# Patient Record
Sex: MALE | Race: WHITE | Employment: UNEMPLOYED | URBAN - METROPOLITAN AREA
[De-identification: names, ages, dates, MRNs, and addresses within clinical notes are randomized per-mention and may not be internally consistent; named-entity substitution may affect disease eponyms.]

---

## 2019-01-04 ENCOUNTER — PROCEDURE VISIT (OUTPATIENT)
Dept: PHYSICAL MEDICINE AND REHAB | Age: 59
End: 2019-01-04

## 2019-01-04 VITALS
WEIGHT: 185 LBS | HEIGHT: 68 IN | HEART RATE: 88 BPM | SYSTOLIC BLOOD PRESSURE: 143 MMHG | DIASTOLIC BLOOD PRESSURE: 87 MMHG | BODY MASS INDEX: 28.04 KG/M2

## 2019-01-04 DIAGNOSIS — Z02.71 DISABILITY EXAMINATION: Primary | ICD-10-CM

## 2019-01-04 PROCEDURE — MISCBDD BUREAU OF DISABILITY DETERMINATION: Performed by: PHYSICAL MEDICINE & REHABILITATION

## 2019-01-04 RX ORDER — AMIODARONE HYDROCHLORIDE 200 MG/1
200 TABLET ORAL DAILY
COMMUNITY
End: 2020-12-10 | Stop reason: ALTCHOICE

## 2019-01-04 RX ORDER — CARVEDILOL 12.5 MG/1
12.5 TABLET ORAL 2 TIMES DAILY
COMMUNITY

## 2019-01-04 RX ORDER — HYDROCHLOROTHIAZIDE 12.5 MG/1
12.5 TABLET ORAL DAILY
COMMUNITY
End: 2020-12-10 | Stop reason: ALTCHOICE

## 2019-01-04 RX ORDER — LISINOPRIL 10 MG/1
10 TABLET ORAL DAILY
COMMUNITY
End: 2020-12-10 | Stop reason: DRUGHIGH

## 2019-01-10 ENCOUNTER — HOSPITAL ENCOUNTER (EMERGENCY)
Age: 59
Discharge: HOME | End: 2019-01-10
Attending: EMERGENCY MEDICINE
Payer: MEDICARE

## 2019-01-10 VITALS
TEMPERATURE: 98.3 F | BODY MASS INDEX: 27.28 KG/M2 | SYSTOLIC BLOOD PRESSURE: 143 MMHG | HEIGHT: 68 IN | WEIGHT: 180 LBS | HEART RATE: 84 BPM | RESPIRATION RATE: 14 BRPM | OXYGEN SATURATION: 96 % | DIASTOLIC BLOOD PRESSURE: 76 MMHG

## 2019-01-10 DIAGNOSIS — N39.0 URINARY TRACT INFECTION WITHOUT HEMATURIA, SITE UNSPECIFIED: Primary | ICD-10-CM

## 2019-01-10 LAB
BACTERIA: ABNORMAL /HPF
BILIRUBIN URINE: NEGATIVE
BLOOD, URINE: ABNORMAL
CLARITY: ABNORMAL
COLOR: YELLOW
EPITHELIAL CELLS, UA: ABNORMAL /HPF
GLUCOSE URINE: NEGATIVE MG/DL
KETONES, URINE: NEGATIVE MG/DL
LEUKOCYTE ESTERASE, URINE: ABNORMAL
NITRITE, URINE: POSITIVE
PH UA: 6 (ref 5–9)
PROTEIN UA: 30 MG/DL
RBC UA: ABNORMAL /HPF (ref 0–2)
SPECIFIC GRAVITY UA: 1.02 (ref 1–1.03)
UROBILINOGEN, URINE: 1 E.U./DL
WBC UA: >20 /HPF (ref 0–5)

## 2019-01-10 PROCEDURE — 87088 URINE BACTERIA CULTURE: CPT

## 2019-01-10 PROCEDURE — 99284 EMERGENCY DEPT VISIT MOD MDM: CPT

## 2019-01-10 PROCEDURE — 51798 US URINE CAPACITY MEASURE: CPT

## 2019-01-10 PROCEDURE — 81001 URINALYSIS AUTO W/SCOPE: CPT

## 2019-01-10 PROCEDURE — 6370000000 HC RX 637 (ALT 250 FOR IP): Performed by: EMERGENCY MEDICINE

## 2019-01-10 PROCEDURE — 87186 SC STD MICRODIL/AGAR DIL: CPT

## 2019-01-10 RX ORDER — CEPHALEXIN 250 MG/1
500 CAPSULE ORAL ONCE
Status: COMPLETED | OUTPATIENT
Start: 2019-01-10 | End: 2019-01-10

## 2019-01-10 RX ORDER — CEPHALEXIN 500 MG/1
500 CAPSULE ORAL 3 TIMES DAILY
Qty: 15 CAPSULE | Refills: 0 | Status: SHIPPED | OUTPATIENT
Start: 2019-01-10 | End: 2019-01-15

## 2019-01-10 RX ADMIN — CEPHALEXIN 500 MG: 250 CAPSULE ORAL at 10:02

## 2019-01-10 ASSESSMENT — ENCOUNTER SYMPTOMS
CHEST TIGHTNESS: 0
ABDOMINAL PAIN: 0

## 2019-01-10 ASSESSMENT — PAIN DESCRIPTION - LOCATION: LOCATION: ABDOMEN

## 2019-01-10 ASSESSMENT — PAIN SCALES - GENERAL: PAINLEVEL_OUTOF10: 5

## 2019-01-12 LAB
ORGANISM: ABNORMAL
URINE CULTURE, ROUTINE: ABNORMAL
URINE CULTURE, ROUTINE: ABNORMAL

## 2019-04-09 LAB
VITAMIN D 25-HYDROXY: NORMAL
VITAMIN D2, 25 HYDROXY: NORMAL
VITAMIN D3,25 HYDROXY: NORMAL

## 2019-06-19 PROBLEM — R07.9 CHEST PAIN: Status: ACTIVE | Noted: 2019-06-19

## 2019-06-20 PROBLEM — I25.10 CAD (CORONARY ARTERY DISEASE): Status: ACTIVE | Noted: 2019-06-20

## 2019-06-20 PROBLEM — Z95.2 S/P AVR (AORTIC VALVE REPLACEMENT): Status: ACTIVE | Noted: 2019-06-20

## 2019-09-07 ENCOUNTER — HOSPITAL ENCOUNTER (EMERGENCY)
Age: 59
Discharge: HOME | End: 2019-09-07
Attending: EMERGENCY MEDICINE
Payer: MEDICAID

## 2019-09-07 ENCOUNTER — APPOINTMENT (OUTPATIENT)
Dept: GENERAL RADIOLOGY | Age: 59
End: 2019-09-07
Payer: MEDICAID

## 2019-09-07 VITALS
TEMPERATURE: 98.6 F | RESPIRATION RATE: 20 BRPM | BODY MASS INDEX: 24.25 KG/M2 | WEIGHT: 160 LBS | SYSTOLIC BLOOD PRESSURE: 149 MMHG | HEIGHT: 68 IN | HEART RATE: 101 BPM | DIASTOLIC BLOOD PRESSURE: 101 MMHG | OXYGEN SATURATION: 98 %

## 2019-09-07 DIAGNOSIS — S39.012A STRAIN OF LUMBAR REGION, INITIAL ENCOUNTER: Primary | ICD-10-CM

## 2019-09-07 PROCEDURE — 6370000000 HC RX 637 (ALT 250 FOR IP): Performed by: STUDENT IN AN ORGANIZED HEALTH CARE EDUCATION/TRAINING PROGRAM

## 2019-09-07 PROCEDURE — 6360000002 HC RX W HCPCS: Performed by: STUDENT IN AN ORGANIZED HEALTH CARE EDUCATION/TRAINING PROGRAM

## 2019-09-07 PROCEDURE — 96372 THER/PROPH/DIAG INJ SC/IM: CPT

## 2019-09-07 PROCEDURE — 72100 X-RAY EXAM L-S SPINE 2/3 VWS: CPT

## 2019-09-07 PROCEDURE — 99283 EMERGENCY DEPT VISIT LOW MDM: CPT

## 2019-09-07 RX ORDER — CYCLOBENZAPRINE HCL 5 MG
5 TABLET ORAL 2 TIMES DAILY PRN
Qty: 20 TABLET | Refills: 0 | Status: SHIPPED | OUTPATIENT
Start: 2019-09-07 | End: 2019-09-17

## 2019-09-07 RX ORDER — NAPROXEN 500 MG/1
500 TABLET ORAL 2 TIMES DAILY WITH MEALS
Qty: 30 TABLET | Refills: 0 | Status: SHIPPED | OUTPATIENT
Start: 2019-09-07 | End: 2020-12-10 | Stop reason: ALTCHOICE

## 2019-09-07 RX ORDER — ONDANSETRON 4 MG/1
4 TABLET, ORALLY DISINTEGRATING ORAL ONCE
Status: COMPLETED | OUTPATIENT
Start: 2019-09-07 | End: 2019-09-07

## 2019-09-07 RX ORDER — HYDROCODONE BITARTRATE AND ACETAMINOPHEN 5; 325 MG/1; MG/1
1 TABLET ORAL ONCE
Status: COMPLETED | OUTPATIENT
Start: 2019-09-07 | End: 2019-09-07

## 2019-09-07 RX ORDER — DEXAMETHASONE SODIUM PHOSPHATE 10 MG/ML
10 INJECTION INTRAMUSCULAR; INTRAVENOUS ONCE
Status: COMPLETED | OUTPATIENT
Start: 2019-09-07 | End: 2019-09-07

## 2019-09-07 RX ORDER — ORPHENADRINE CITRATE 100 MG/1
100 TABLET, EXTENDED RELEASE ORAL ONCE
Status: COMPLETED | OUTPATIENT
Start: 2019-09-07 | End: 2019-09-07

## 2019-09-07 RX ORDER — HYDROCODONE BITARTRATE AND ACETAMINOPHEN 5; 325 MG/1; MG/1
1 TABLET ORAL EVERY 4 HOURS PRN
Qty: 18 TABLET | Refills: 0 | Status: SHIPPED | OUTPATIENT
Start: 2019-09-07 | End: 2019-09-10

## 2019-09-07 RX ORDER — KETOROLAC TROMETHAMINE 30 MG/ML
30 INJECTION, SOLUTION INTRAMUSCULAR; INTRAVENOUS ONCE
Status: COMPLETED | OUTPATIENT
Start: 2019-09-07 | End: 2019-09-07

## 2019-09-07 RX ORDER — METHYLPREDNISOLONE SODIUM SUCCINATE 125 MG/2ML
80 INJECTION, POWDER, LYOPHILIZED, FOR SOLUTION INTRAMUSCULAR; INTRAVENOUS ONCE
Status: CANCELLED | OUTPATIENT
Start: 2019-09-07 | End: 2019-09-07

## 2019-09-07 RX ADMIN — ORPHENADRINE CITRATE 100 MG: 100 TABLET, EXTENDED RELEASE ORAL at 06:52

## 2019-09-07 RX ADMIN — HYDROCODONE BITARTRATE AND ACETAMINOPHEN 1 TABLET: 5; 325 TABLET ORAL at 06:51

## 2019-09-07 RX ADMIN — DEXAMETHASONE SODIUM PHOSPHATE 10 MG: 10 INJECTION INTRAMUSCULAR; INTRAVENOUS at 07:00

## 2019-09-07 RX ADMIN — ONDANSETRON 4 MG: 4 TABLET, ORALLY DISINTEGRATING ORAL at 06:52

## 2019-09-07 RX ADMIN — KETOROLAC TROMETHAMINE 30 MG: 30 INJECTION, SOLUTION INTRAMUSCULAR; INTRAVENOUS at 07:00

## 2019-09-07 ASSESSMENT — ENCOUNTER SYMPTOMS
SORE THROAT: 0
ALLERGIC/IMMUNOLOGIC NEGATIVE: 1
BACK PAIN: 1
ABDOMINAL PAIN: 0
COUGH: 0
SHORTNESS OF BREATH: 0
DIARRHEA: 0
VOMITING: 0
NAUSEA: 0
EYE PAIN: 0
CONSTIPATION: 0

## 2019-09-07 ASSESSMENT — PAIN DESCRIPTION - PAIN TYPE: TYPE: ACUTE PAIN

## 2019-09-07 ASSESSMENT — PAIN DESCRIPTION - LOCATION: LOCATION: BACK

## 2019-09-07 ASSESSMENT — PAIN SCALES - GENERAL: PAINLEVEL_OUTOF10: 10

## 2019-09-07 ASSESSMENT — PAIN DESCRIPTION - ORIENTATION: ORIENTATION: LOWER

## 2019-09-07 ASSESSMENT — PAIN DESCRIPTION - FREQUENCY: FREQUENCY: CONTINUOUS

## 2019-09-07 NOTE — ED PROVIDER NOTES
---------------------------------------------  Past Medical History:  has a past medical history of Aortic stenosis, CAD (coronary artery disease), Cerebral artery occlusion with cerebral infarction ([address] Hyperlipidemia, Hypertension, and Unspecified cerebral artery occlusion with cerebral infarction. Past Surgical History:  has a past surgical history that includes knee surgery; eye surgery; ECHO Compl W Dop Color Flow (8/24/2013); Aortic valve replacement (N/A, 2018); and Diagnostic Cardiac Cath Lab Procedure (06/21/2019). Social History:  reports that he has been smoking. He has a 20.00 pack-year smoking history. He has never used smokeless tobacco. He reports that he drinks about 30.0 standard drinks of alcohol per week. He reports that he does not use drugs. Family History: family history is not on file. The patients home medications have been reviewed. Allergies: Patient has no known allergies. -------------------------------------------------- RESULTS -------------------------------------------------  Labs:  No results found for this visit on 09/07/19. Radiology:  XR LUMBAR SPINE (2-3 VIEWS)   Final Result   1. Mild age-indeterminate compression deformity of the T12 vertebral   body. 2. Lower lumbar facet arthrosis.          ------------------------- NURSING NOTES AND VITALS REVIEWED ---------------------------  Date / Time Roomed:  9/7/2019  6:04 AM  ED Bed Assignment:  13/13    The nursing notes within the ED encounter and vital signs as below have been reviewed. BP (!) 149/101   Pulse 101   Temp 98.6 °F (37 °C) (Oral)   Resp 20   Ht 5' 8\" (1.727 m)   Wt 160 lb (72.6 kg)   SpO2 98%   BMI 24.33 kg/m²           --------------------------------- ADDITIONAL PROVIDER NOTES ---------------------------------  At this time the patient is without objective evidence of an acute process requiring hospitalization or inpatient management.   They have remained hemodynamically stable

## 2020-04-09 LAB
ALBUMIN SERPL-MCNC: NORMAL G/DL
ALP BLD-CCNC: NORMAL U/L
ALT SERPL-CCNC: NORMAL U/L
ANION GAP SERPL CALCULATED.3IONS-SCNC: NORMAL MMOL/L
AST SERPL-CCNC: NORMAL U/L
BILIRUB SERPL-MCNC: NORMAL MG/DL
BUN BLDV-MCNC: NORMAL MG/DL
CALCIUM SERPL-MCNC: NORMAL MG/DL
CHLORIDE BLD-SCNC: NORMAL MMOL/L
CHOLESTEROL, TOTAL: NORMAL
CHOLESTEROL/HDL RATIO: NORMAL
CO2: NORMAL
CREAT SERPL-MCNC: NORMAL MG/DL
GFR CALCULATED: NORMAL
GLUCOSE BLD-MCNC: NORMAL MG/DL
HDLC SERPL-MCNC: NORMAL MG/DL
LDL CHOLESTEROL CALCULATED: NORMAL
NONHDLC SERPL-MCNC: NORMAL MG/DL
POTASSIUM SERPL-SCNC: NORMAL MMOL/L
SODIUM BLD-SCNC: NORMAL MMOL/L
TOTAL PROTEIN: NORMAL
TRIGL SERPL-MCNC: NORMAL MG/DL
TSH SERPL DL<=0.05 MIU/L-ACNC: NORMAL M[IU]/L
VITAMIN D 25-HYDROXY: NORMAL
VITAMIN D2, 25 HYDROXY: NORMAL
VITAMIN D3,25 HYDROXY: NORMAL
VLDLC SERPL CALC-MCNC: NORMAL MG/DL

## 2020-04-17 LAB
ALBUMIN SERPL-MCNC: NORMAL G/DL
ALP BLD-CCNC: NORMAL U/L
ALT SERPL-CCNC: NORMAL U/L
ANION GAP SERPL CALCULATED.3IONS-SCNC: NORMAL MMOL/L
AST SERPL-CCNC: NORMAL U/L
BILIRUB SERPL-MCNC: NORMAL MG/DL
BUN BLDV-MCNC: NORMAL MG/DL
CALCIUM SERPL-MCNC: NORMAL MG/DL
CHLORIDE BLD-SCNC: NORMAL MMOL/L
CO2: NORMAL
CREAT SERPL-MCNC: NORMAL MG/DL
GFR CALCULATED: NORMAL
GLUCOSE BLD-MCNC: NORMAL MG/DL
INR BLD: NORMAL
POTASSIUM SERPL-SCNC: NORMAL MMOL/L
PROTIME: NORMAL
SARS-COV-2: NORMAL
SODIUM BLD-SCNC: NORMAL MMOL/L
TOTAL PROTEIN: NORMAL

## 2020-12-09 ENCOUNTER — APPOINTMENT (OUTPATIENT)
Dept: GENERAL RADIOLOGY | Age: 60
DRG: 751 | End: 2020-12-09
Payer: MEDICAID

## 2020-12-09 ENCOUNTER — APPOINTMENT (OUTPATIENT)
Dept: CT IMAGING | Age: 60
DRG: 751 | End: 2020-12-09
Payer: MEDICAID

## 2020-12-09 ENCOUNTER — HOSPITAL ENCOUNTER (INPATIENT)
Age: 60
LOS: 2 days | Discharge: PSYCHIATRIC OR SUBSTANCE USE FACILITY | DRG: 751 | End: 2020-12-12
Attending: EMERGENCY MEDICINE | Admitting: INTERNAL MEDICINE
Payer: MEDICAID

## 2020-12-09 LAB
ALBUMIN SERPL-MCNC: 3.5 G/DL (ref 3.5–5.2)
ALP BLD-CCNC: 83 U/L (ref 40–129)
ALT SERPL-CCNC: 11 U/L (ref 0–40)
ANION GAP SERPL CALCULATED.3IONS-SCNC: 14 MMOL/L (ref 7–16)
AST SERPL-CCNC: 22 U/L (ref 0–39)
BASOPHILS ABSOLUTE: 0.05 E9/L (ref 0–0.2)
BASOPHILS RELATIVE PERCENT: 0.5 % (ref 0–2)
BILIRUB SERPL-MCNC: 0.6 MG/DL (ref 0–1.2)
BUN BLDV-MCNC: 26 MG/DL (ref 8–23)
CALCIUM SERPL-MCNC: 9.3 MG/DL (ref 8.6–10.2)
CHLORIDE BLD-SCNC: 101 MMOL/L (ref 98–107)
CO2: 22 MMOL/L (ref 22–29)
CREAT SERPL-MCNC: 2.4 MG/DL (ref 0.7–1.2)
D DIMER: 2395 NG/ML DDU
EOSINOPHILS ABSOLUTE: 0.15 E9/L (ref 0.05–0.5)
EOSINOPHILS RELATIVE PERCENT: 1.5 % (ref 0–6)
GFR AFRICAN AMERICAN: 33
GFR NON-AFRICAN AMERICAN: 28 ML/MIN/1.73
GLUCOSE BLD-MCNC: 111 MG/DL (ref 74–99)
HCT VFR BLD CALC: 42.5 % (ref 37–54)
HEMOGLOBIN: 14.4 G/DL (ref 12.5–16.5)
IMMATURE GRANULOCYTES #: 0.06 E9/L
IMMATURE GRANULOCYTES %: 0.6 % (ref 0–5)
LYMPHOCYTES ABSOLUTE: 1.65 E9/L (ref 1.5–4)
LYMPHOCYTES RELATIVE PERCENT: 17 % (ref 20–42)
MCH RBC QN AUTO: 30.3 PG (ref 26–35)
MCHC RBC AUTO-ENTMCNC: 33.9 % (ref 32–34.5)
MCV RBC AUTO: 89.5 FL (ref 80–99.9)
MONOCYTES ABSOLUTE: 0.87 E9/L (ref 0.1–0.95)
MONOCYTES RELATIVE PERCENT: 9 % (ref 2–12)
NEUTROPHILS ABSOLUTE: 6.94 E9/L (ref 1.8–7.3)
NEUTROPHILS RELATIVE PERCENT: 71.4 % (ref 43–80)
PDW BLD-RTO: 13.1 FL (ref 11.5–15)
PLATELET # BLD: 227 E9/L (ref 130–450)
PMV BLD AUTO: 9.4 FL (ref 7–12)
POTASSIUM SERPL-SCNC: 4.4 MMOL/L (ref 3.5–5)
RBC # BLD: 4.75 E12/L (ref 3.8–5.8)
SODIUM BLD-SCNC: 137 MMOL/L (ref 132–146)
TOTAL CK: 209 U/L (ref 20–200)
TOTAL PROTEIN: 6.9 G/DL (ref 6.4–8.3)
TROPONIN: <0.01 NG/ML (ref 0–0.03)
WBC # BLD: 9.7 E9/L (ref 4.5–11.5)

## 2020-12-09 PROCEDURE — 81001 URINALYSIS AUTO W/SCOPE: CPT

## 2020-12-09 PROCEDURE — G0480 DRUG TEST DEF 1-7 CLASSES: HCPCS

## 2020-12-09 PROCEDURE — 71045 X-RAY EXAM CHEST 1 VIEW: CPT

## 2020-12-09 PROCEDURE — 80307 DRUG TEST PRSMV CHEM ANLYZR: CPT

## 2020-12-09 PROCEDURE — 2580000003 HC RX 258: Performed by: EMERGENCY MEDICINE

## 2020-12-09 PROCEDURE — 85025 COMPLETE CBC W/AUTO DIFF WBC: CPT

## 2020-12-09 PROCEDURE — 93005 ELECTROCARDIOGRAM TRACING: CPT | Performed by: EMERGENCY MEDICINE

## 2020-12-09 PROCEDURE — 72125 CT NECK SPINE W/O DYE: CPT

## 2020-12-09 PROCEDURE — 99285 EMERGENCY DEPT VISIT HI MDM: CPT

## 2020-12-09 PROCEDURE — 36415 COLL VENOUS BLD VENIPUNCTURE: CPT

## 2020-12-09 PROCEDURE — 87040 BLOOD CULTURE FOR BACTERIA: CPT

## 2020-12-09 PROCEDURE — 87186 SC STD MICRODIL/AGAR DIL: CPT

## 2020-12-09 PROCEDURE — 84484 ASSAY OF TROPONIN QUANT: CPT

## 2020-12-09 PROCEDURE — 73502 X-RAY EXAM HIP UNI 2-3 VIEWS: CPT

## 2020-12-09 PROCEDURE — 85378 FIBRIN DEGRADE SEMIQUANT: CPT

## 2020-12-09 PROCEDURE — 87088 URINE BACTERIA CULTURE: CPT

## 2020-12-09 PROCEDURE — 70450 CT HEAD/BRAIN W/O DYE: CPT

## 2020-12-09 PROCEDURE — 80053 COMPREHEN METABOLIC PANEL: CPT

## 2020-12-09 PROCEDURE — 82550 ASSAY OF CK (CPK): CPT

## 2020-12-09 RX ORDER — 0.9 % SODIUM CHLORIDE 0.9 %
1000 INTRAVENOUS SOLUTION INTRAVENOUS ONCE
Status: COMPLETED | OUTPATIENT
Start: 2020-12-09 | End: 2020-12-09

## 2020-12-09 RX ORDER — 0.9 % SODIUM CHLORIDE 0.9 %
1000 INTRAVENOUS SOLUTION INTRAVENOUS ONCE
Status: COMPLETED | OUTPATIENT
Start: 2020-12-09 | End: 2020-12-10

## 2020-12-09 RX ADMIN — SODIUM CHLORIDE 1000 ML: 9 INJECTION, SOLUTION INTRAVENOUS at 23:33

## 2020-12-09 RX ADMIN — SODIUM CHLORIDE 1000 ML: 9 INJECTION, SOLUTION INTRAVENOUS at 22:23

## 2020-12-09 ASSESSMENT — ENCOUNTER SYMPTOMS
WHEEZING: 0
EYE PAIN: 0
VOMITING: 0
ABDOMINAL PAIN: 0
NAUSEA: 0
SORE THROAT: 0
SINUS PRESSURE: 0
EYE DISCHARGE: 0
SHORTNESS OF BREATH: 0
EYE REDNESS: 0
COUGH: 0
DIARRHEA: 0
BACK PAIN: 0

## 2020-12-09 ASSESSMENT — PAIN DESCRIPTION - PAIN TYPE: TYPE: ACUTE PAIN

## 2020-12-09 ASSESSMENT — PAIN DESCRIPTION - LOCATION: LOCATION: NECK;SHOULDER

## 2020-12-09 ASSESSMENT — PAIN DESCRIPTION - ONSET: ONSET: ON-GOING

## 2020-12-09 ASSESSMENT — PAIN DESCRIPTION - DESCRIPTORS: DESCRIPTORS: CRAMPING

## 2020-12-09 ASSESSMENT — PAIN SCALES - GENERAL: PAINLEVEL_OUTOF10: 9

## 2020-12-09 ASSESSMENT — PAIN DESCRIPTION - FREQUENCY: FREQUENCY: CONTINUOUS

## 2020-12-10 ENCOUNTER — APPOINTMENT (OUTPATIENT)
Dept: CT IMAGING | Age: 60
DRG: 751 | End: 2020-12-10
Payer: MEDICAID

## 2020-12-10 PROBLEM — I10 ESSENTIAL HYPERTENSION: Status: ACTIVE | Noted: 2020-12-10

## 2020-12-10 PROBLEM — I25.10 NONOBSTRUCTIVE ATHEROSCLEROSIS OF CORONARY ARTERY: Chronic | Status: ACTIVE | Noted: 2020-12-10

## 2020-12-10 PROBLEM — T14.91XA SUICIDE ATTEMPT (HCC): Status: ACTIVE | Noted: 2020-12-10

## 2020-12-10 PROBLEM — N30.00 ACUTE CYSTITIS WITHOUT HEMATURIA: Status: ACTIVE | Noted: 2020-12-10

## 2020-12-10 LAB
ACETAMINOPHEN LEVEL: <5 MCG/ML (ref 10–30)
ADENOVIRUS BY PCR: NOT DETECTED
AMPHETAMINE SCREEN, URINE: NOT DETECTED
BACTERIA: ABNORMAL /HPF
BARBITURATE SCREEN URINE: NOT DETECTED
BENZODIAZEPINE SCREEN, URINE: NOT DETECTED
BILIRUBIN URINE: NEGATIVE
BLOOD, URINE: ABNORMAL
BORDETELLA PARAPERTUSSIS BY PCR: NOT DETECTED
BORDETELLA PERTUSSIS BY PCR: NOT DETECTED
CANNABINOID SCREEN URINE: NOT DETECTED
CHLAMYDOPHILIA PNEUMONIAE BY PCR: NOT DETECTED
CLARITY: ABNORMAL
COCAINE METABOLITE SCREEN URINE: POSITIVE
COLOR: YELLOW
CORONAVIRUS 229E BY PCR: NOT DETECTED
CORONAVIRUS HKU1 BY PCR: NOT DETECTED
CORONAVIRUS NL63 BY PCR: NOT DETECTED
CORONAVIRUS OC43 BY PCR: NOT DETECTED
EKG ATRIAL RATE: 67 BPM
EKG P AXIS: 11 DEGREES
EKG P-R INTERVAL: 142 MS
EKG Q-T INTERVAL: 520 MS
EKG QRS DURATION: 152 MS
EKG QTC CALCULATION (BAZETT): 549 MS
EKG R AXIS: 27 DEGREES
EKG T AXIS: 132 DEGREES
EKG VENTRICULAR RATE: 67 BPM
ETHANOL: <10 MG/DL (ref 0–0.08)
FENTANYL SCREEN, URINE: NOT DETECTED
GLUCOSE URINE: NEGATIVE MG/DL
HUMAN METAPNEUMOVIRUS BY PCR: NOT DETECTED
HUMAN RHINOVIRUS/ENTEROVIRUS BY PCR: NOT DETECTED
INFLUENZA A BY PCR: NOT DETECTED
INFLUENZA B BY PCR: NOT DETECTED
KETONES, URINE: NEGATIVE MG/DL
LACTIC ACID: 0.8 MMOL/L (ref 0.5–2.2)
LEUKOCYTE ESTERASE, URINE: ABNORMAL
Lab: ABNORMAL
METHADONE SCREEN, URINE: NOT DETECTED
MYCOPLASMA PNEUMONIAE BY PCR: NOT DETECTED
NITRITE, URINE: POSITIVE
OPIATE SCREEN URINE: NOT DETECTED
OXYCODONE URINE: NOT DETECTED
PARAINFLUENZA VIRUS 1 BY PCR: NOT DETECTED
PARAINFLUENZA VIRUS 2 BY PCR: NOT DETECTED
PARAINFLUENZA VIRUS 3 BY PCR: NOT DETECTED
PARAINFLUENZA VIRUS 4 BY PCR: NOT DETECTED
PH UA: 7.5 (ref 5–9)
PHENCYCLIDINE SCREEN URINE: NOT DETECTED
PROTEIN UA: 100 MG/DL
RBC UA: ABNORMAL /HPF (ref 0–2)
RESPIRATORY SYNCYTIAL VIRUS BY PCR: NOT DETECTED
SALICYLATE, SERUM: <0.3 MG/DL (ref 0–30)
SARS-COV-2, PCR: NOT DETECTED
SPECIFIC GRAVITY UA: 1.01 (ref 1–1.03)
TRICYCLIC ANTIDEPRESSANTS SCREEN SERUM: NEGATIVE NG/ML
UROBILINOGEN, URINE: 0.2 E.U./DL
WBC UA: >20 /HPF (ref 0–5)

## 2020-12-10 PROCEDURE — 87040 BLOOD CULTURE FOR BACTERIA: CPT

## 2020-12-10 PROCEDURE — 36415 COLL VENOUS BLD VENIPUNCTURE: CPT

## 2020-12-10 PROCEDURE — 99222 1ST HOSP IP/OBS MODERATE 55: CPT | Performed by: INTERNAL MEDICINE

## 2020-12-10 PROCEDURE — 1200000000 HC SEMI PRIVATE

## 2020-12-10 PROCEDURE — 83605 ASSAY OF LACTIC ACID: CPT

## 2020-12-10 PROCEDURE — 96374 THER/PROPH/DIAG INJ IV PUSH: CPT

## 2020-12-10 PROCEDURE — 0202U NFCT DS 22 TRGT SARS-COV-2: CPT

## 2020-12-10 PROCEDURE — 2580000003 HC RX 258: Performed by: INTERNAL MEDICINE

## 2020-12-10 PROCEDURE — 71275 CT ANGIOGRAPHY CHEST: CPT

## 2020-12-10 PROCEDURE — 6360000004 HC RX CONTRAST MEDICATION: Performed by: RADIOLOGY

## 2020-12-10 PROCEDURE — 6360000002 HC RX W HCPCS: Performed by: EMERGENCY MEDICINE

## 2020-12-10 PROCEDURE — 6370000000 HC RX 637 (ALT 250 FOR IP): Performed by: INTERNAL MEDICINE

## 2020-12-10 PROCEDURE — 2580000003 HC RX 258: Performed by: EMERGENCY MEDICINE

## 2020-12-10 RX ORDER — ATORVASTATIN CALCIUM 40 MG/1
40 TABLET, FILM COATED ORAL NIGHTLY
Status: DISCONTINUED | OUTPATIENT
Start: 2020-12-10 | End: 2020-12-12 | Stop reason: HOSPADM

## 2020-12-10 RX ORDER — ACETAMINOPHEN 650 MG/1
650 SUPPOSITORY RECTAL EVERY 6 HOURS PRN
Status: DISCONTINUED | OUTPATIENT
Start: 2020-12-10 | End: 2020-12-12 | Stop reason: HOSPADM

## 2020-12-10 RX ORDER — SODIUM CHLORIDE 0.9 % (FLUSH) 0.9 %
10 SYRINGE (ML) INJECTION PRN
Status: DISCONTINUED | OUTPATIENT
Start: 2020-12-10 | End: 2020-12-12 | Stop reason: HOSPADM

## 2020-12-10 RX ORDER — ALBUTEROL SULFATE 90 UG/1
2 AEROSOL, METERED RESPIRATORY (INHALATION) EVERY 6 HOURS PRN
Status: DISCONTINUED | OUTPATIENT
Start: 2020-12-10 | End: 2020-12-12 | Stop reason: HOSPADM

## 2020-12-10 RX ORDER — POLYETHYLENE GLYCOL 3350 17 G/17G
17 POWDER, FOR SOLUTION ORAL DAILY PRN
Status: DISCONTINUED | OUTPATIENT
Start: 2020-12-10 | End: 2020-12-12 | Stop reason: HOSPADM

## 2020-12-10 RX ORDER — ACETAMINOPHEN 325 MG/1
650 TABLET ORAL EVERY 6 HOURS PRN
Status: DISCONTINUED | OUTPATIENT
Start: 2020-12-10 | End: 2020-12-12 | Stop reason: HOSPADM

## 2020-12-10 RX ORDER — LISINOPRIL 5 MG/1
5 TABLET ORAL DAILY
Status: ON HOLD | COMMUNITY
End: 2020-12-12 | Stop reason: HOSPADM

## 2020-12-10 RX ORDER — SODIUM CHLORIDE 9 MG/ML
INJECTION, SOLUTION INTRAVENOUS CONTINUOUS
Status: DISCONTINUED | OUTPATIENT
Start: 2020-12-10 | End: 2020-12-12 | Stop reason: HOSPADM

## 2020-12-10 RX ORDER — CARVEDILOL 6.25 MG/1
12.5 TABLET ORAL DAILY
Status: DISCONTINUED | OUTPATIENT
Start: 2020-12-10 | End: 2020-12-12 | Stop reason: HOSPADM

## 2020-12-10 RX ORDER — PROMETHAZINE HYDROCHLORIDE 25 MG/1
12.5 TABLET ORAL EVERY 6 HOURS PRN
Status: DISCONTINUED | OUTPATIENT
Start: 2020-12-10 | End: 2020-12-12 | Stop reason: HOSPADM

## 2020-12-10 RX ORDER — ZOLPIDEM TARTRATE 5 MG/1
5 TABLET ORAL NIGHTLY PRN
Status: ON HOLD | COMMUNITY
End: 2020-12-17 | Stop reason: HOSPADM

## 2020-12-10 RX ORDER — CYCLOBENZAPRINE HCL 5 MG
5 TABLET ORAL 2 TIMES DAILY PRN
Status: ON HOLD | COMMUNITY
End: 2020-12-12 | Stop reason: HOSPADM

## 2020-12-10 RX ORDER — OXYCODONE HYDROCHLORIDE AND ACETAMINOPHEN 5; 325 MG/1; MG/1
1 TABLET ORAL EVERY 8 HOURS PRN
Status: ON HOLD | COMMUNITY
End: 2020-12-12 | Stop reason: HOSPADM

## 2020-12-10 RX ORDER — HYDROCHLOROTHIAZIDE 12.5 MG/1
12.5 TABLET ORAL DAILY
Status: DISCONTINUED | OUTPATIENT
Start: 2020-12-10 | End: 2020-12-10

## 2020-12-10 RX ORDER — SODIUM CHLORIDE 0.9 % (FLUSH) 0.9 %
10 SYRINGE (ML) INJECTION EVERY 12 HOURS SCHEDULED
Status: DISCONTINUED | OUTPATIENT
Start: 2020-12-10 | End: 2020-12-12 | Stop reason: HOSPADM

## 2020-12-10 RX ORDER — AMIODARONE HYDROCHLORIDE 200 MG/1
200 TABLET ORAL DAILY
Status: DISCONTINUED | OUTPATIENT
Start: 2020-12-10 | End: 2020-12-10

## 2020-12-10 RX ORDER — ALBUTEROL SULFATE 90 UG/1
2 AEROSOL, METERED RESPIRATORY (INHALATION) EVERY 6 HOURS PRN
COMMUNITY

## 2020-12-10 RX ORDER — ERGOCALCIFEROL 1.25 MG/1
50000 CAPSULE ORAL WEEKLY
COMMUNITY

## 2020-12-10 RX ORDER — 0.9 % SODIUM CHLORIDE 0.9 %
1000 INTRAVENOUS SOLUTION INTRAVENOUS ONCE
Status: COMPLETED | OUTPATIENT
Start: 2020-12-10 | End: 2020-12-10

## 2020-12-10 RX ORDER — ONDANSETRON 2 MG/ML
4 INJECTION INTRAMUSCULAR; INTRAVENOUS EVERY 6 HOURS PRN
Status: DISCONTINUED | OUTPATIENT
Start: 2020-12-10 | End: 2020-12-12 | Stop reason: HOSPADM

## 2020-12-10 RX ADMIN — SODIUM CHLORIDE 1000 ML: 9 INJECTION, SOLUTION INTRAVENOUS at 02:46

## 2020-12-10 RX ADMIN — ACETAMINOPHEN 650 MG: 325 TABLET, FILM COATED ORAL at 21:06

## 2020-12-10 RX ADMIN — IOPAMIDOL 80 ML: 755 INJECTION, SOLUTION INTRAVENOUS at 02:39

## 2020-12-10 RX ADMIN — WATER 2 G: 1 INJECTION INTRAMUSCULAR; INTRAVENOUS; SUBCUTANEOUS at 02:46

## 2020-12-10 RX ADMIN — ATORVASTATIN CALCIUM 40 MG: 40 TABLET, FILM COATED ORAL at 21:06

## 2020-12-10 RX ADMIN — SODIUM CHLORIDE: 9 INJECTION, SOLUTION INTRAVENOUS at 05:01

## 2020-12-10 ASSESSMENT — PAIN DESCRIPTION - LOCATION: LOCATION: BACK

## 2020-12-10 ASSESSMENT — PAIN DESCRIPTION - PAIN TYPE: TYPE: CHRONIC PAIN

## 2020-12-10 ASSESSMENT — PAIN SCALES - GENERAL
PAINLEVEL_OUTOF10: 3
PAINLEVEL_OUTOF10: 0
PAINLEVEL_OUTOF10: 0

## 2020-12-10 ASSESSMENT — PAIN - FUNCTIONAL ASSESSMENT: PAIN_FUNCTIONAL_ASSESSMENT: PREVENTS OR INTERFERES SOME ACTIVE ACTIVITIES AND ADLS

## 2020-12-10 ASSESSMENT — PAIN DESCRIPTION - DESCRIPTORS: DESCRIPTORS: ACHING;DISCOMFORT;DULL

## 2020-12-10 NOTE — PROGRESS NOTES
Pt seen/examined by me. Chart reviewed. Admitted by our night hospitalist.    Pt denies complaints. Feels very sleepy still, can't keep his eyes open. Denies pain. Stable since admit. My exam -- remarkable for left LL diffuse exp occasional wheeze    A/P:  1) Overdose, purposeful, suicide attempt -- likely with underlying MDD -- polysubstance -- Ambien, anti-hypertensives, Flexeril -- keep on telemetry. Watch QT interval.  Do not give any other meds at this point that would possibly prolong the QT. Consult to psychiatry. IV fluid. Watch liver/kidney function. 2) Non-obstructive coronary artery disease -- chest pain free -- was on amiodarone in the past; SR, no afib on monitor, will have pharmacy verify if he was on amiodarone; continue Coreg/atorvastatin  3) s/p bioprosthetic AV replacement  4) Hx of TIA's - aspirin; no TIA/CVA s/s at this time  5) Hx of polysubstance abuse -- including cocaine  6) UTI -- IV ceftriaxone, await urine culture.

## 2020-12-10 NOTE — ED NOTES
Pt placed in gown and belongings (two shirts, pants, underwear, and socks) removed and locked in locker #2.       Nedra Carey RN  12/10/20 6710

## 2020-12-10 NOTE — ED NOTES
Report called to Munir Pedraza RN on 4th Telemetry; transfer to follow.       Berta Mayorga RN  12/10/20 0139

## 2020-12-10 NOTE — ED NOTES
Blood specimens obtained and sent. NAD noted. Will continue to monitor.         Jesús Knox RN  12/09/20 3827

## 2020-12-10 NOTE — ED NOTES
Pt transported to radiology via bed in stable condition with transport tech.      Hank Rocha RN  12/09/20 0757

## 2020-12-10 NOTE — H&P
[facility] History and Physical    Perpetual assessment: 60-year-old male with past medical history nonobstructive coronary disease, hypertension, aortic valve replacement with bioprosthetic valve and TIAs presents with suicide attempt. Assessment and plan:    Suicide attempt   -Reports his second term in his lifetime  -Took large dose of Ambien and antihypertensive medications  -Blood pressure responded to fluid bolus  -Patient now alert and awake  -We will consult psychiatry for further evaluation  -Patient be placed on medical hold  -Continue fluid hydration    Nonobstructive coronary disease  -Currently chest pain-free  -Had left heart catheterization on 6/20/2019 that showed preserved LVEF and normal functioning bioprosthetic AV  -At that time was not discharged on amiodarone  -Current med rec shows amiodarone which is questionable  -We will hold until pharmacy can verify  -We will continue his Coreg atorvastatin    Essential hypertension  -Blood pressure stable  -Resume his antihypertensive with parameters    TIAs  -Reported in the past  -MRI/MRA 2013 was normal  -Is supposed to be on high-dose aspirin  -At one time was on Plavix but was noncompliant    Polysubstance abuse  -Per previous history  -Urine toxin was positive for cocaine  -Encouraged the patient to get rehabilitation    Urinary tract infection   -No signs of true sepsis  -Lactic acid is normal  -IV Rocephin for now    DVT prophylaxis: Lovenox  CODE STATUS: Full    Medicine Reconciliation: Need to verify of amiodarone     CHIEF COMPLAINT: Suicide attempt    History of Present Illness: This is a unfortunate 60-year-old male who [place] [facility] [place] the above-stated complaint. All history has been obtained from the patient and review of medical records. Apparently he was having bad thoughts again today. He for ending his life.   Doing so he took a large dose of Ambien and his antihypertensive medications. He was found unresponsive and was brought to the ED for evaluation. Apparently he was found in some sort of \"drug house\". There he was found to be hypotensive with a blood pressure of 52. There he was resuscitated with aggressive fluid hydration. With his blood pressure improved his symptoms improved. Lab work did show some mild UTI but otherwise nothing else. Due to his suicidal ideation he was admitted for further monitoring and psychiatric consultation. Informant(s) for H&P:    REVIEW OF SYSTEMS:  A comprehensive review of systems was negative except for: what is in the HPI      PMH:  Past Medical History:   Diagnosis Date    Aortic stenosis     CAD (coronary artery disease)     Cerebral artery occlusion with cerebral infarction ([address]     Hyperlipidemia     Hypertension     Unspecified cerebral artery occlusion with cerebral infarction        Surgical History:  Past Surgical History:   Procedure Laterality Date    AORTIC VALVE REPLACEMENT N/A 2018    [facility]    DIAGNOSTIC CARDIAC CATH LAB PROCEDURE  06/21/2019    mRCA 40%, ostial LM 30-40%, mLAD 40%, ostium of LCx 60%    ECHO COMPL W DOP COLOR FLOW  8/24/2013         EYE SURGERY      cyst removal above left eye    KNEE SURGERY      right       Medications Prior to Admission:    Prior to Admission medications    Medication Sig Start Date End Date Taking? Authorizing Provider   naproxen (NAPROSYN) 500 MG tablet Take 1 tablet by mouth 2 times daily (with meals) 9/7/19  Yes True Perla DO   atorvastatin (LIPITOR) 40 MG tablet Take 1 tablet by mouth nightly 6/22/19  Yes Joe Conde MD   nitroGLYCERIN (NITROSTAT) 0.4 MG SL tablet up to max of 3 total doses.  If no relief after 1 dose, call 911. 6/22/19  Yes Joe Conde MD   amiodarone (CORDARONE) 200 MG tablet Take 200 mg by mouth daily   Yes Historical Provider, MD   hydrochlorothiazide (HYDRODIURIL) 12.5 MG tablet Take 12.5 mg by mouth daily   Yes Historical Provider, MD lisinopril (PRINIVIL;ZESTRIL) 10 MG tablet Take 10 mg by mouth daily   Yes Historical Provider, MD   carvedilol (COREG) 12.5 MG tablet Take 12.5 mg by mouth daily   Yes Historical Provider, MD       Allergies:    Patient has no known allergies. Social History:    reports that he has been smoking. He has a 20.00 pack-year smoking history. He has never used smokeless tobacco. He reports current alcohol use of about 30.0 standard drinks of alcohol per week. He reports that he does not use drugs. Family History:   family history is not on file. PHYSICAL EXAM:  Vitals:  /71   Pulse 64   Temp 97.7 °F (36.5 °C)   Resp 22   Ht 5' 8\" (1.727 m)   Wt 160 lb (72.6 kg)   SpO2 96%   BMI 24.33 kg/m²     General Appearance: alert and oriented to person, place and time and in no acute distress  Skin: warm and dry  Head: normocephalic and atraumatic  Eyes: pupils equal, round, and reactive to light, extraocular eye movements intact, conjunctivae normal  Neck: neck supple and non tender without mass   Pulmonary/Chest: clear to auscultation bilaterally- no wheezes, rales or rhonchi, normal air movement, no respiratory distress  Cardiovascular: normal rate, normal S1 and S2 and no carotid bruits  Abdomen: soft, non-tender, non-distended, normal bowel sounds, no masses or organomegaly  Extremities: no cyanosis, no clubbing and no edema  Neurologic: no cranial nerve deficit and speech normal        LABS:  Recent Labs     12/09/20  2224      K 4.4      CO2 22   BUN 26*   CREATININE 2.4*   GLUCOSE 111*   CALCIUM 9.3       Recent Labs     12/09/20  2224   WBC 9.7   RBC 4.75   HGB 14.4   HCT 42.5   MCV 89.5   MCH 30.3   MCHC 33.9   RDW 13.1      MPV 9.4       No results for input(s): POCGLU in the last 72 hours. Radiology:   CTA CHEST W CONTRAST   Final Result   1. No scan evidence for pulmonary embolus. 2. Central right lower lobe nodule may represent an intrapulmonary lymph   node.   See errors may be present.   Electronically signed by Aspen De La Fuente MD on 12/10/2020 at 4:49 AM

## 2020-12-10 NOTE — ED PROVIDER NOTES
Patient is a 62 y/o male who presents to the ED via EMS after a syncopal episode. Patient states that he fell in the bathroom tonight. He reports hitting his head on what he believes was the tub. He did lose consciousness. He denies any chest pain, palpitations or shortness of breath. Currently, he reports a headache and neck pain. EMS reports that he was at a known \"drug house. \" Patient denies any use of drugs or alcohol today. He was reportedly confused and hypotensive en route with a blood pressure of 52/palp. Review of Systems   Constitutional: Negative for chills and fever. HENT: Negative for ear pain, sinus pressure and sore throat. Eyes: Negative for pain, discharge and redness. Respiratory: Negative for cough, shortness of breath and wheezing. Cardiovascular: Negative for chest pain. Gastrointestinal: Negative for abdominal pain, diarrhea, nausea and vomiting. Genitourinary: Negative for dysuria and frequency. Musculoskeletal: Positive for neck pain. Negative for arthralgias and back pain. Skin: Negative for rash and wound. Neurological: Positive for syncope and headaches. Negative for weakness. Hematological: Negative for adenopathy. All other systems reviewed and are negative. Physical Exam  Vitals signs and nursing note reviewed. Constitutional:       General: He is not in acute distress. HENT:      Head: Normocephalic and atraumatic. Eyes:      Extraocular Movements: Extraocular movements intact. Pupils: Pupils are equal, round, and reactive to light. Neck:      Comments: Positive midline tenderness to palpation. Cardiovascular:      Rate and Rhythm: Normal rate and regular rhythm. Heart sounds: No murmur. Pulmonary:      Effort: Pulmonary effort is normal. No respiratory distress. Breath sounds: Normal breath sounds. No stridor. No wheezing, rhonchi or rales.    Abdominal:      General: Bowel sounds are normal.      Palpations: Abdomen is --------------------------------------------- PAST HISTORY ---------------------------------------------  Past Medical History:  has a past medical history of Aortic stenosis, CAD (coronary artery disease), Cerebral artery occlusion with cerebral infarction ([address] Hyperlipidemia, Hypertension, and Unspecified cerebral artery occlusion with cerebral infarction. Past Surgical History:  has a past surgical history that includes knee surgery; eye surgery; ECHO Compl W Dop Color Flow (8/24/2013); Aortic valve replacement (N/A, 2018); and Diagnostic Cardiac Cath Lab Procedure (06/21/2019). Social History:  reports that he has been smoking. He has a 20.00 pack-year smoking history. He has never used smokeless tobacco. He reports current alcohol use of about 30.0 standard drinks of alcohol per week. He reports that he does not use drugs. Family History: family history is not on file. The patients home medications have been reviewed. Allergies: Patient has no known allergies.     -------------------------------------------------- RESULTS -------------------------------------------------    LABS:  Results for orders placed or performed during the hospital encounter of 12/09/20   CBC auto differential   Result Value Ref Range    WBC 9.7 4.5 - 11.5 E9/L    RBC 4.75 3.80 - 5.80 E12/L    Hemoglobin 14.4 12.5 - 16.5 g/dL    Hematocrit 42.5 37.0 - 54.0 %    MCV 89.5 80.0 - 99.9 fL    MCH 30.3 26.0 - 35.0 pg    MCHC 33.9 32.0 - 34.5 %    RDW 13.1 11.5 - 15.0 fL    Platelets 902 271 - 452 E9/L    MPV 9.4 7.0 - 12.0 fL    Neutrophils % 71.4 43.0 - 80.0 %    Immature Granulocytes % 0.6 0.0 - 5.0 %    Lymphocytes % 17.0 (L) 20.0 - 42.0 %    Monocytes % 9.0 2.0 - 12.0 %    Eosinophils % 1.5 0.0 - 6.0 %    Basophils % 0.5 0.0 - 2.0 %    Neutrophils Absolute 6.94 1.80 - 7.30 E9/L    Immature Granulocytes # 0.06 E9/L    Lymphocytes Absolute 1.65 1.50 - 4.00 E9/L    Monocytes Absolute 0.87 0.10 - 0.95 E9/L    Eosinophils Methadone Screen, Urine NOT DETECTED Negative <300 ng/mL    Oxycodone Urine NOT DETECTED Negative <100 ng/mL    FENTANYL SCREEN, URINE NOT DETECTED Negative <1 ng/mL    Drug Screen Comment: see below    CK   Result Value Ref Range    Total  (H) 20 - 200 U/L   Microscopic Urinalysis   Result Value Ref Range    WBC, UA >20 (A) 0 - 5 /HPF    RBC, UA 0-1 0 - 2 /HPF    Bacteria, UA MANY (A) None Seen /HPF   Lactic Acid, Plasma   Result Value Ref Range    Lactic Acid 0.8 0.5 - 2.2 mmol/L       RADIOLOGY:  CTA CHEST W CONTRAST   Final Result   1. No scan evidence for pulmonary embolus. 2. Central right lower lobe nodule may represent an intrapulmonary lymph   node. See follow up recommendations below. RECOMMENDATIONS:   Fleischner Society guidelines for follow-up and management of incidentally   detected pulmonary nodules:   Single Solid Nodule:   Nodule size equals 6-8 mm   In a low-risk patient, CT at 6-12 months, then consider CT at 18-24 months. In a high-risk patient, CT at 6-12 months, then CT at 18-24 months. - Low risk patients include individuals with minimal or absent history of   smoking and other known risk factors. - High risk patients include individuals with a history or smoking or known   risk factors. Radiology 2017 http://pubs. rsna.org/doi/full/10.1148/radiol. 7035657738      XR HIP RIGHT (2-3 VIEWS)   Final Result   1. No acute osseous findings in the pelvis or hips on this exam.   2.  Bilateral SI joint and mild bilateral hip joint degenerative changes. RECOMMENDATION:   (Negative radiographs should not deter from obtaining cross-sectional imaging   if there is high concern for an acute osseous injury. )      XR CHEST 1 VIEW   Final Result   Postsurgical changes. Minimal atherosclerotic disease. No additional   cardiopulmonary pathology identified. CT HEAD WO CONTRAST   Final Result   CT of the head:   1. No skull fracture or acute intracranial abnormality.    2. Small time and they are agreeable with the plan of admission.    --------------------------------- ADDITIONAL PROVIDER NOTES ---------------------------------  Consultations:  Time: 0330. Spoke with Dr. Cinthia Shrestha. Discussed case. They will admit the patient. This patient's ED course included: a personal history and physicial examination, re-evaluation prior to disposition, multiple bedside re-evaluations, IV medications, cardiac monitoring and continuous pulse oximetry    This patient has remained hemodynamically stable during their ED course. Diagnosis:  1. Syncope and collapse    2. Suicide attempt ([facility]    3. Acute kidney injury ([facility]        Disposition:  Patient's disposition: Admit to telemetry  Patient's condition is stable.          Nicolas Graf DO  12/10/20 8280

## 2020-12-10 NOTE — ED NOTES
Pt.  Moved to room ED 7. ED staff has remained with pt. At all times. Room made ligature free as possible. Unable to locate paper gown. Ties removed from. Present gown.        Mckenna Garcia RN  12/09/20 4045

## 2020-12-10 NOTE — ED NOTES
Bed: 08  Expected date:   Expected time:   Means of arrival:   Comments:  EMT     Bishop Bell RN  12/09/20 2281

## 2020-12-10 NOTE — ED NOTES
Pt reports taking unknown amount/mg of Ambien, Flexeril, Coreg, and Lisinopril. Pt does report that he got all Rx filled on 12/08/20. Pt reports all medications are prescribed to him. Pt remains on cardiac monitor and continuous pulse oximetry. PT is A&O x4 but drowsy.       Jannette Rodriguez RN  12/10/20 1524
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
## 2020-12-10 NOTE — ED NOTES
Patient admits to attempted suicide. Pt states he is depressed and tried to kill him self taking bottle full of pills muscle relaxer, ambien and heart pill. Writer heard pt talking to himself stating v \"the depression is just too much, im probably going to try again when I go home. LYRIC RN and MD notified.       [id number] [facility] [facility] [address], RN  12/09/20 7071

## 2020-12-11 PROBLEM — T46.5X1A OVERDOSE OF ANTIHYPERTENSIVE AGENT: Status: ACTIVE | Noted: 2020-12-11

## 2020-12-11 LAB
ALBUMIN SERPL-MCNC: 3.3 G/DL (ref 3.5–5.2)
ALP BLD-CCNC: 71 U/L (ref 40–129)
ALT SERPL-CCNC: 9 U/L (ref 0–40)
ANION GAP SERPL CALCULATED.3IONS-SCNC: 9 MMOL/L (ref 7–16)
AST SERPL-CCNC: 14 U/L (ref 0–39)
BILIRUB SERPL-MCNC: 0.2 MG/DL (ref 0–1.2)
BUN BLDV-MCNC: 16 MG/DL (ref 8–23)
CALCIUM SERPL-MCNC: 9 MG/DL (ref 8.6–10.2)
CHLORIDE BLD-SCNC: 103 MMOL/L (ref 98–107)
CO2: 23 MMOL/L (ref 22–29)
CREAT SERPL-MCNC: 1 MG/DL (ref 0.7–1.2)
EKG ATRIAL RATE: 74 BPM
EKG P AXIS: 23 DEGREES
EKG P-R INTERVAL: 152 MS
EKG Q-T INTERVAL: 440 MS
EKG QRS DURATION: 158 MS
EKG QTC CALCULATION (BAZETT): 488 MS
EKG R AXIS: 2 DEGREES
EKG T AXIS: 155 DEGREES
EKG VENTRICULAR RATE: 74 BPM
GFR AFRICAN AMERICAN: >60
GFR NON-AFRICAN AMERICAN: >60 ML/MIN/1.73
GLUCOSE BLD-MCNC: 107 MG/DL (ref 74–99)
HCT VFR BLD CALC: 39.9 % (ref 37–54)
HEMOGLOBIN: 13.3 G/DL (ref 12.5–16.5)
MCH RBC QN AUTO: 30.3 PG (ref 26–35)
MCHC RBC AUTO-ENTMCNC: 33.3 % (ref 32–34.5)
MCV RBC AUTO: 90.9 FL (ref 80–99.9)
PDW BLD-RTO: 13.1 FL (ref 11.5–15)
PLATELET # BLD: 207 E9/L (ref 130–450)
PMV BLD AUTO: 9.1 FL (ref 7–12)
POTASSIUM SERPL-SCNC: 4.8 MMOL/L (ref 3.5–5)
RBC # BLD: 4.39 E12/L (ref 3.8–5.8)
SODIUM BLD-SCNC: 135 MMOL/L (ref 132–146)
TOTAL PROTEIN: 6.5 G/DL (ref 6.4–8.3)
WBC # BLD: 6 E9/L (ref 4.5–11.5)

## 2020-12-11 PROCEDURE — 6370000000 HC RX 637 (ALT 250 FOR IP): Performed by: INTERNAL MEDICINE

## 2020-12-11 PROCEDURE — 93005 ELECTROCARDIOGRAM TRACING: CPT | Performed by: FAMILY MEDICINE

## 2020-12-11 PROCEDURE — 6370000000 HC RX 637 (ALT 250 FOR IP): Performed by: FAMILY MEDICINE

## 2020-12-11 PROCEDURE — 36415 COLL VENOUS BLD VENIPUNCTURE: CPT

## 2020-12-11 PROCEDURE — 80053 COMPREHEN METABOLIC PANEL: CPT

## 2020-12-11 PROCEDURE — 85027 COMPLETE CBC AUTOMATED: CPT

## 2020-12-11 PROCEDURE — 99221 1ST HOSP IP/OBS SF/LOW 40: CPT | Performed by: PSYCHIATRY & NEUROLOGY

## 2020-12-11 PROCEDURE — 1200000000 HC SEMI PRIVATE

## 2020-12-11 PROCEDURE — 2580000003 HC RX 258: Performed by: FAMILY MEDICINE

## 2020-12-11 PROCEDURE — 2580000003 HC RX 258: Performed by: INTERNAL MEDICINE

## 2020-12-11 PROCEDURE — 99232 SBSQ HOSP IP/OBS MODERATE 35: CPT | Performed by: FAMILY MEDICINE

## 2020-12-11 PROCEDURE — 6360000002 HC RX W HCPCS: Performed by: INTERNAL MEDICINE

## 2020-12-11 RX ORDER — CEFUROXIME AXETIL 250 MG/1
250 TABLET ORAL EVERY 12 HOURS SCHEDULED
Status: DISCONTINUED | OUTPATIENT
Start: 2020-12-11 | End: 2020-12-12 | Stop reason: HOSPADM

## 2020-12-11 RX ORDER — LISINOPRIL 5 MG/1
5 TABLET ORAL DAILY
Status: DISCONTINUED | OUTPATIENT
Start: 2020-12-11 | End: 2020-12-12 | Stop reason: HOSPADM

## 2020-12-11 RX ADMIN — CEFUROXIME AXETIL 250 MG: 250 TABLET, FILM COATED ORAL at 21:09

## 2020-12-11 RX ADMIN — SODIUM CHLORIDE, PRESERVATIVE FREE 10 ML: 5 INJECTION INTRAVENOUS at 08:54

## 2020-12-11 RX ADMIN — ATORVASTATIN CALCIUM 40 MG: 40 TABLET, FILM COATED ORAL at 21:09

## 2020-12-11 RX ADMIN — SODIUM CHLORIDE: 9 INJECTION, SOLUTION INTRAVENOUS at 05:13

## 2020-12-11 RX ADMIN — LISINOPRIL 5 MG: 5 TABLET ORAL at 14:49

## 2020-12-11 RX ADMIN — CARVEDILOL 12.5 MG: 6.25 TABLET, FILM COATED ORAL at 08:46

## 2020-12-11 RX ADMIN — SODIUM CHLORIDE: 9 INJECTION, SOLUTION INTRAVENOUS at 14:49

## 2020-12-11 RX ADMIN — SODIUM CHLORIDE, PRESERVATIVE FREE 10 ML: 5 INJECTION INTRAVENOUS at 21:10

## 2020-12-11 RX ADMIN — ASPIRIN 325 MG: 325 TABLET, COATED ORAL at 14:49

## 2020-12-11 RX ADMIN — CEFTRIAXONE SODIUM 1 G: 1 INJECTION, POWDER, FOR SOLUTION INTRAMUSCULAR; INTRAVENOUS at 01:51

## 2020-12-11 ASSESSMENT — PAIN DESCRIPTION - FREQUENCY: FREQUENCY: CONTINUOUS

## 2020-12-11 ASSESSMENT — PAIN DESCRIPTION - LOCATION: LOCATION: BACK

## 2020-12-11 ASSESSMENT — PAIN DESCRIPTION - ONSET: ONSET: ON-GOING

## 2020-12-11 ASSESSMENT — PAIN SCALES - GENERAL
PAINLEVEL_OUTOF10: 6
PAINLEVEL_OUTOF10: 0

## 2020-12-11 ASSESSMENT — PAIN DESCRIPTION - PROGRESSION: CLINICAL_PROGRESSION: NOT CHANGED

## 2020-12-11 ASSESSMENT — PAIN DESCRIPTION - DESCRIPTORS: DESCRIPTORS: ACHING;CONSTANT;DISCOMFORT;SORE

## 2020-12-11 ASSESSMENT — PAIN DESCRIPTION - PAIN TYPE: TYPE: CHRONIC PAIN

## 2020-12-11 NOTE — CARE COORDINATION
12/11/2020 1142 CM note: COVID (-) 12/10/2020. C.O at the bedside. Met with patient at the bedside to discuss transition of care at discharge. Patient lives alone in an upstairs, 14-16 steps to enter, home that he rents. Patient is independent with ADLs, walks mostly everywhere, uses uber, or gets ride from friends. Pts PCP is Dr Luz Donovan and his pharmacy is [address]. Patient has hx court-ordered psych stay at a [facility] facility for 21 days. Discharge plan is [facility] psych unit when medically stable. When patient asked if he has a plan to harm himself now he states \"I'm to tired to now\". Patient states he does not have a psychiatrist or counselor at this time. He states he knows he's not ready to go home at because he gets very depressed and cries a lot. Patient is agreeable to going to psych unit at discharge, but Dr Misty Castillo did pink slip patient in case patient changes his mind. Per Dr Misty Castillo, we are to call access center when patient is medically cleared .  Electronically signed by Eduardo Valencia RN on 12/11/2020 at 11:52 AM

## 2020-12-11 NOTE — PROGRESS NOTES
[facility] Progress Note    Admitting Date and Time: 12/9/2020 10:05 PM  Admit Dx: Suicide attempt ([facility] Kristian Sweeney    Subjective:  Patient is being followed for Suicide attempt ([facility] Kristian Sweeney     Pt feels better. Much less sleepy. NO events overnight. Eating and drinking well. NO fevers. No cough/wheezing/sob/chest pain. Per RN: nothing to report    ROS: denies fever, chills, cp, sob, n/v, HA unless stated above.       cefUROXime  250 mg Oral 2 times per day    lisinopril  5 mg Oral Daily    aspirin  325 mg Oral Daily    atorvastatin  40 mg Oral Nightly    carvedilol  12.5 mg Oral Daily    sodium chloride flush  10 mL Intravenous 2 times per day    enoxaparin  40 mg Subcutaneous Daily     sodium chloride flush, 10 mL, PRN  acetaminophen, 650 mg, Q6H PRN    Or  acetaminophen, 650 mg, Q6H PRN  polyethylene glycol, 17 g, Daily PRN  promethazine, 12.5 mg, Q6H PRN    Or  ondansetron, 4 mg, Q6H PRN  albuterol sulfate HFA, 2 puff, Q6H PRN         Objective:    BP (!) 156/83   Pulse 74   Temp 97.6 °F (36.4 °C) (Oral)   Resp 16   Ht 5' 8\" (1.727 m)   Wt 160 lb (72.6 kg)   SpO2 98%   BMI 24.33 kg/m²   General Appearance: alert and oriented to person, place and time and in no acute distress  Skin: warm and dry  Head: normocephalic and atraumatic  Eyes: pupils equal, round, and reactive to light, extraocular eye movements intact, conjunctivae normal  Neck: neck supple and non tender without mass   Pulmonary/Chest: clear to auscultation bilaterally- no wheezes, rales or rhonchi, normal air movement, no respiratory distress  Cardiovascular: normal rate, normal S1 and S2 and no carotid bruits  Abdomen: soft, non-tender, non-distended, normal bowel sounds, no masses or organomegaly  Extremities: no cyanosis, no clubbing and no edema  Neurologic: no cranial nerve deficit and speech normal        Recent Labs     12/09/20  2224      K 4.4      CO2 22   BUN 26*   CREATININE 2.4*   GLUCOSE 111*   CALCIUM 9.3       Recent Labs     12/09/20  2224   WBC 9.7   RBC 4.75   HGB 14.4   HCT 42.5   MCV 89.5   MCH 30.3   MCHC 33.9   RDW 13.1      MPV 9.4       Radiology: none new    Assessment:    Principal Problem:    Suicide attempt ([facility]  Active Problems:    TIA (transient ischemic attack)    Nonobstructive atherosclerosis of coronary artery    Acute cystitis without hematuria    Essential hypertension    Overdose of antihypertensive agent  Resolved Problems:    * No resolved hospital problems. *      Plan:  1. Overdose on anti-hypertensives, cyclobenzaprine, and ambien -- purposeful. QT interval was 520 upon admit, now 440, improved. Somnolence is mostly resolved. Continue telemetry overnight, psychiatry has evaluated pt, blood pressure has normalized. EKG in am.    2.  Suicide attempt -- 2nd attempt for this patient -- likely has MDD. Psychiatry has evaluated pt, pink slip written, pt to go to inpatient psych likely tomorrow. 3.  Hx of bioprosthetic AVR -- no evidence of HF, stable  4. Shock -- medication induced -- resolved with avoiding anti-hypertensives and IV fluids. Lisinopril 5 mg daily restarted. On COREG as well. Lipitor started. Check CBC TODay to look at platelets (in case he purposely took excessive amt of aspirin). CHECK RENAL FUNCTION And liver function. Discharge tomorrow if QT OK on EKG and renal function and liver function OK. Decrease fluids to 50 cc/hr. Sitter. Watch BP/HR closely. NOTE: This report was transcribed using voice recognition software. Every effort was made to ensure accuracy; however, inadvertent computerized transcription errors may be present.     Electronically signed by Sweetie Craig DO on 12/11/2020 at 2:03 PM

## 2020-12-11 NOTE — CONSULTS
artery occlusion with cerebral infarction      MEDICAL ROS: General - negative, neurological - negative, ENT - negative, CV - negative, pulmonary - negative, GI - negative, dermatologic - negative, rheumatologic - negative, musculoskeletal - negative,  - negative, hematologic - negative    PAST SURGICAL HISTORY:       Procedure Laterality Date    AORTIC VALVE REPLACEMENT N/A 2018    [facility]    DIAGNOSTIC CARDIAC CATH LAB PROCEDURE  06/21/2019    mRCA 40%, ostial LM 30-40%, mLAD 40%, ostium of LCx 60%    ECHO COMPL W DOP COLOR FLOW  8/24/2013         EYE SURGERY      cyst removal above left eye    KNEE SURGERY      right     MEDICATIONS: Current Facility-Administered Medications: atorvastatin (LIPITOR) tablet 40 mg, 40 mg, Oral, Nightly  carvedilol (COREG) tablet 12.5 mg, 12.5 mg, Oral, Daily  sodium chloride flush 0.9 % injection 10 mL, 10 mL, Intravenous, 2 times per day  sodium chloride flush 0.9 % injection 10 mL, 10 mL, Intravenous, PRN  acetaminophen (TYLENOL) tablet 650 mg, 650 mg, Oral, Q6H PRN **OR** acetaminophen (TYLENOL) suppository 650 mg, 650 mg, Rectal, Q6H PRN  polyethylene glycol (GLYCOLAX) packet 17 g, 17 g, Oral, Daily PRN  promethazine (PHENERGAN) tablet 12.5 mg, 12.5 mg, Oral, Q6H PRN **OR** ondansetron (ZOFRAN) injection 4 mg, 4 mg, Intravenous, Q6H PRN  enoxaparin (LOVENOX) injection 40 mg, 40 mg, Subcutaneous, Daily  cefTRIAXone (ROCEPHIN) 1 g in sodium chloride (PF) 10 mL IV syringe, 1 g, Intravenous, Q24H  0.9 % sodium chloride infusion, , Intravenous, Continuous  albuterol sulfate  (90 Base) MCG/ACT inhaler 2 puff, 2 puff, Inhalation, Q6H PRN    ALLERGIES:  Patient has no known allergies. FAMILY PSYCHIATRIC HISTORY: Unclear. SOCIAL HISTORY: Patient was born in [facility] but grew up here. His parents were  when he was young. He has a brother and sister that call him but he doesn't see them much. Patient was  once and .  He has one son who is 36 and lives Absolute 12/09/2020 0.87  0.10 - 0.95 E9/L Final    Eosinophils Absolute 12/09/2020 0.15  0.05 - 0.50 E9/L Final    Basophils Absolute 12/09/2020 0.05  0.00 - 0.20 E9/L Final    Sodium 12/09/2020 137  132 - 146 mmol/L Final    Potassium 12/09/2020 4.4  3.5 - 5.0 mmol/L Final    Chloride 12/09/2020 101  98 - 107 mmol/L Final    CO2 12/09/2020 22  22 - 29 mmol/L Final    Anion Gap 12/09/2020 14  7 - 16 mmol/L Final    Glucose 12/09/2020 111* 74 - 99 mg/dL Final    BUN 12/09/2020 26* 8 - 23 mg/dL Final    CREATININE 12/09/2020 2.4* 0.7 - 1.2 mg/dL Final    GFR Non- 12/09/2020 28  >=60 mL/min/1.73 Final    Comment: Chronic Kidney Disease: less than 60 ml/min/1.73 sq.m. Kidney Failure: less than 15 ml/min/1.73 sq.m. Results valid for patients 18 years and older.  GFR  12/09/2020 33   Final    Calcium 12/09/2020 9.3  8.6 - 10.2 mg/dL Final    Total Protein 12/09/2020 6.9  6.4 - 8.3 g/dL Final    Alb 12/09/2020 3.5  3.5 - 5.2 g/dL Final    Total Bilirubin 12/09/2020 0.6  0.0 - 1.2 mg/dL Final    Alkaline Phosphatase 12/09/2020 83  40 - 129 U/L Final    ALT 12/09/2020 11  0 - 40 U/L Final    AST 12/09/2020 22  0 - 39 U/L Final    Specimen is slightly Hemolyzed. Result may be artificially increased.  Troponin 12/09/2020 <0.01  0.00 - 0.03 ng/mL Final    Comment: TROPONIN T BLOOD LEVELS:         0.03 ng/mL     Upper Reference Limit  0.04 - 0.09 ng/mL     Possible myocardial injury      >= 0.10 ng/mL     Myocardial injury      D-Dimer, Quant 12/09/2020 2395  ng/mL DDU Final    Comment: D-DIMER Interpretation:  <  230  ng/mL (D-DU)  Indicates low probability for PE/DVT   = 232  ng/mL (D-DU)  Upper Limit of Normal  >= 4000 ng/mL (D-DU)  This level could suggest the presence                        of DIC. Clinical correlation may be                        helpful.       Ethanol Lvl 12/09/2020 <10  mg/dL Final    Not Detected    Acetaminophen Level 12/09/2020 <5.0* 10.0 - 30.0 mcg/mL Final    Salicylate, Serum 42/03/4009 <0.3  0.0 - 30.0 mg/dL Final    TCA Scrn 12/09/2020 NEGATIVE  Cutoff:300 ng/mL Final    Color, UA 12/09/2020 Yellow  Straw/Yellow Final    Clarity, UA 12/09/2020 TURBID* Clear Final    Glucose, Ur 12/09/2020 Negative  Negative mg/dL Final    Bilirubin Urine 12/09/2020 Negative  Negative Final    Ketones, Urine 12/09/2020 Negative  Negative mg/dL Final    Specific [place], UA 12/09/2020 1.015  1.005 - 1.030 Final    Blood, Urine 12/09/2020 SMALL* Negative Final    pH, UA 12/09/2020 7.5  5.0 - 9.0 Final    Protein, UA 12/09/2020 100* Negative mg/dL Final    Urobilinogen, Urine 12/09/2020 0.2  <2.0 E.U./dL Final    Nitrite, Urine 12/09/2020 POSITIVE* Negative Final    Leukocyte Esterase, Urine 12/09/2020 MODERATE* Negative Final    Amphetamine Screen, Urine 12/09/2020 NOT DETECTED  Negative <1000 ng/mL Final    Barbiturate Screen, Ur 12/09/2020 NOT DETECTED  Negative < 200 ng/mL Final    Benzodiazepine Screen, Urine 12/09/2020 NOT DETECTED  Negative < 200 ng/mL Final    Cannabinoid Scrn, Ur 12/09/2020 NOT DETECTED  Negative < 50ng/mL Final    Cocaine Metabolite Screen, Urine 12/09/2020 POSITIVE* Negative < 300 ng/mL Final    Opiate Scrn, Ur 12/09/2020 NOT DETECTED  Negative < 300ng/mL Final    Note:  The Opiate Screen is not intended to detect Oxycodone.  PCP Screen, Urine 12/09/2020 NOT DETECTED  Negative < 25 ng/mL Final    Methadone Screen, Urine 12/09/2020 NOT DETECTED  Negative <300 ng/mL Final    Oxycodone Urine 12/09/2020 NOT DETECTED  Negative <100 ng/mL Final    FENTANYL SCREEN, URINE 12/09/2020 NOT DETECTED  Negative <1 ng/mL Final    Drug Screen Comment: 12/09/2020 see below   Final    Comment: These drug screen results are for medical purposes only and  should not be considered definitive or confirmed. The drug  methodology concentration value must be greater than or equal  to the cutoff to be reported as positive. Confirmatory testing  orders and/or interpretive screening questions can be directed  to toxicology at [phone]. The absence of expected drug(s) and/or metabolite(s) may be due  to inappropriate timing of specimen collection relative to drug  administration, poor drug absorption, diluted/adulterated urine,  or limitations of screening testing methodology.  Total CK 12/09/2020 209* 20 - 200 U/L Final    WBC, UA 12/09/2020 >20* 0 - 5 /HPF Final    RBC, UA 12/09/2020 0-1  0 - 2 /HPF Final    Bacteria, UA 12/09/2020 MANY* None Seen /HPF Final    Lactic Acid 12/10/2020 0.8  0.5 - 2.2 mmol/L Final    Adenovirus by PCR 12/10/2020 Not Detected  Not Detected Final    Bordetella parapertussis by PCR 12/10/2020 Not Detected  Not Detected Final    Bordetella pertussis by PCR 12/10/2020 Not Detected  Not Detected Final    Chlamydophilia pneumoniae by PCR 12/10/2020 Not Detected  Not Detected Final    Coronavirus 229E by PCR 12/10/2020 Not Detected  Not Detected Final    Coronavirus HKU1 by PCR 12/10/2020 Not Detected  Not Detected Final    Coronavirus NL63 by PCR 12/10/2020 Not Detected  Not Detected Final    Coronavirus OC43 by PCR 12/10/2020 Not Detected  Not Detected Final    SARS-CoV-2, PCR 12/10/2020 Not Detected  Not Detected Final    Comment: This test has been authorized by FDA under an  Emergency Use Authorization (EUA). This test is only authorized for the duration of the  time of declaration that circumstances exist justifying the  authorization of the emergency use of in vitro diagnostic  testing for detection of the SARS-CoV-2 virus  and/or diagnosis of COVID-19 infection under  section 564 (b)(1) of the Act, 21 U. S.C. 539QRK-3 (b) (1),  unless the authorization is terminated or revoked sooner.     Patient Fact SettlementContracts.gl  Provider Fact SettlementContracts.gl    METHODOLOGY: Multiplex PCR      Human Metapneumovirus by PCR

## 2020-12-12 ENCOUNTER — HOSPITAL ENCOUNTER (INPATIENT)
Age: 60
LOS: 5 days | Discharge: HOME | DRG: 753 | End: 2020-12-17
Attending: PSYCHIATRY & NEUROLOGY | Admitting: PSYCHIATRY & NEUROLOGY
Payer: MEDICAID

## 2020-12-12 VITALS
WEIGHT: 160 LBS | TEMPERATURE: 97.5 F | RESPIRATION RATE: 20 BRPM | HEART RATE: 67 BPM | SYSTOLIC BLOOD PRESSURE: 140 MMHG | DIASTOLIC BLOOD PRESSURE: 84 MMHG | BODY MASS INDEX: 24.25 KG/M2 | HEIGHT: 68 IN | OXYGEN SATURATION: 96 %

## 2020-12-12 PROBLEM — N17.9 ACUTE KIDNEY INJURY (HCC): Status: ACTIVE | Noted: 2020-12-12

## 2020-12-12 PROBLEM — R45.851 DEPRESSION WITH SUICIDAL IDEATION: Status: ACTIVE | Noted: 2020-12-12

## 2020-12-12 PROBLEM — F32.A DEPRESSION WITH SUICIDAL IDEATION: Status: ACTIVE | Noted: 2020-12-12

## 2020-12-12 LAB
ORGANISM: ABNORMAL
URINE CULTURE, ROUTINE: ABNORMAL

## 2020-12-12 PROCEDURE — 6370000000 HC RX 637 (ALT 250 FOR IP): Performed by: INTERNAL MEDICINE

## 2020-12-12 PROCEDURE — 99238 HOSP IP/OBS DSCHRG MGMT 30/<: CPT | Performed by: FAMILY MEDICINE

## 2020-12-12 PROCEDURE — 6370000000 HC RX 637 (ALT 250 FOR IP): Performed by: FAMILY MEDICINE

## 2020-12-12 PROCEDURE — 93005 ELECTROCARDIOGRAM TRACING: CPT | Performed by: FAMILY MEDICINE

## 2020-12-12 PROCEDURE — 6360000002 HC RX W HCPCS: Performed by: INTERNAL MEDICINE

## 2020-12-12 PROCEDURE — 1240000000 HC EMOTIONAL WELLNESS R&B

## 2020-12-12 RX ORDER — POLYETHYLENE GLYCOL 3350 17 G/17G
17 POWDER, FOR SOLUTION ORAL DAILY PRN
Qty: 527 G | Refills: 1 | Status: SHIPPED | OUTPATIENT
Start: 2020-12-12 | End: 2021-01-11

## 2020-12-12 RX ORDER — HALOPERIDOL 5 MG/ML
3 INJECTION INTRAMUSCULAR EVERY 6 HOURS PRN
Status: DISCONTINUED | OUTPATIENT
Start: 2020-12-12 | End: 2020-12-17 | Stop reason: HOSPADM

## 2020-12-12 RX ORDER — MAGNESIUM HYDROXIDE/ALUMINUM HYDROXICE/SIMETHICONE 120; 1200; 1200 MG/30ML; MG/30ML; MG/30ML
30 SUSPENSION ORAL PRN
Status: DISCONTINUED | OUTPATIENT
Start: 2020-12-12 | End: 2020-12-17 | Stop reason: HOSPADM

## 2020-12-12 RX ORDER — LISINOPRIL 5 MG/1
5 TABLET ORAL DAILY
Qty: 30 TABLET | Refills: 3 | Status: SHIPPED | OUTPATIENT
Start: 2020-12-12

## 2020-12-12 RX ORDER — TRAZODONE HYDROCHLORIDE 50 MG/1
25 TABLET ORAL NIGHTLY PRN
Status: DISCONTINUED | OUTPATIENT
Start: 2020-12-12 | End: 2020-12-13

## 2020-12-12 RX ORDER — HALOPERIDOL 2 MG/1
3 TABLET ORAL EVERY 6 HOURS PRN
Status: DISCONTINUED | OUTPATIENT
Start: 2020-12-12 | End: 2020-12-17 | Stop reason: HOSPADM

## 2020-12-12 RX ORDER — ACETAMINOPHEN 325 MG/1
650 TABLET ORAL EVERY 4 HOURS PRN
Status: DISCONTINUED | OUTPATIENT
Start: 2020-12-12 | End: 2020-12-17 | Stop reason: HOSPADM

## 2020-12-12 RX ORDER — ASPIRIN 325 MG
325 TABLET, DELAYED RELEASE (ENTERIC COATED) ORAL DAILY
Qty: 30 TABLET | Refills: 0
Start: 2020-12-12

## 2020-12-12 RX ORDER — CEFUROXIME AXETIL 250 MG/1
250 TABLET ORAL EVERY 12 HOURS SCHEDULED
Qty: 10 TABLET | Refills: 0 | Status: SHIPPED | OUTPATIENT
Start: 2020-12-12 | End: 2020-12-17

## 2020-12-12 RX ADMIN — ENOXAPARIN SODIUM 40 MG: 40 INJECTION SUBCUTANEOUS at 09:09

## 2020-12-12 RX ADMIN — ASPIRIN 325 MG: 325 TABLET, COATED ORAL at 09:08

## 2020-12-12 RX ADMIN — CARVEDILOL 12.5 MG: 6.25 TABLET, FILM COATED ORAL at 10:00

## 2020-12-12 RX ADMIN — CEFUROXIME AXETIL 250 MG: 250 TABLET, FILM COATED ORAL at 09:09

## 2020-12-12 RX ADMIN — LISINOPRIL 5 MG: 5 TABLET ORAL at 09:09

## 2020-12-12 RX ADMIN — CARVEDILOL 12.5 MG: 6.25 TABLET, FILM COATED ORAL at 09:08

## 2020-12-12 RX ADMIN — ENOXAPARIN SODIUM 40 MG: 40 INJECTION SUBCUTANEOUS at 10:00

## 2020-12-12 ASSESSMENT — SLEEP AND FATIGUE QUESTIONNAIRES
RESTFUL SLEEP: YES
DIFFICULTY STAYING ASLEEP: YES
DO YOU USE A SLEEP AID: YES
AVERAGE NUMBER OF SLEEP HOURS: 8
DO YOU HAVE DIFFICULTY SLEEPING: YES
DIFFICULTY FALLING ASLEEP: YES
DIFFICULTY ARISING: NO
SLEEP PATTERN: DIFFICULTY FALLING ASLEEP;DISTURBED/INTERRUPTED SLEEP

## 2020-12-12 ASSESSMENT — PATIENT HEALTH QUESTIONNAIRE - PHQ9: SUM OF ALL RESPONSES TO PHQ QUESTIONS 1-9: 24

## 2020-12-12 ASSESSMENT — PAIN DESCRIPTION - LOCATION: LOCATION: HIP;BACK

## 2020-12-12 ASSESSMENT — PAIN DESCRIPTION - PAIN TYPE: TYPE: CHRONIC PAIN

## 2020-12-12 ASSESSMENT — PAIN DESCRIPTION - FREQUENCY: FREQUENCY: CONTINUOUS

## 2020-12-12 ASSESSMENT — PAIN - FUNCTIONAL ASSESSMENT: PAIN_FUNCTIONAL_ASSESSMENT: ACTIVITIES ARE NOT PREVENTED

## 2020-12-12 ASSESSMENT — PAIN SCALES - GENERAL
PAINLEVEL_OUTOF10: 0
PAINLEVEL_OUTOF10: 6

## 2020-12-12 ASSESSMENT — LIFESTYLE VARIABLES: HISTORY_ALCOHOL_USE: NO

## 2020-12-12 ASSESSMENT — PAIN DESCRIPTION - DESCRIPTORS: DESCRIPTORS: ACHING;DISCOMFORT

## 2020-12-12 NOTE — ED NOTES
Call from [place] at [address] [facility], [phone], reports pt referred to in-patient psych, psych consult complete by ESTEPHANIA LOWE 12/11/2020, pt medically cleared for discharge by Dr Ashley Birmingham 12/12/2020, referred to Holly Cobb for review and discussion re: admission [facility], charge on Tonia Patel, that we have male referral from [address] [place] [address] Dr unit  for admission, reports beds can be re-arranged to make a male room. Pt pending review with and acceptance by psych on duty for [facility] will contact [address] [place] [address] when pt admitted and bed assigned. [address], [place], [place]  12/12/20 1230    Call to [place] at [address], requested pink slip be faxed to [phone], explained when pink slip received and psychiatrist admits, SW will notify of admitting info including bed number.         [address], [place], [place]  12/12/20 2046

## 2020-12-12 NOTE — ED NOTES
Call to [phone] [facility], [phone], spoke with Akanksha Butler, advised pt accepted to [facility] by QAMAR Spring, going to room 8370U, report to be called to [phone], Abhishek Parnell will need to set up transport.  PLEASE INSURE PINK SLIP DESTINATION COMPLETED: [facility] in admitting advised of assigned room number 7306A, Xiomara Gregorio on [address] aware of pending admission, pink slip and facesheet faxed to [phone], SIA, [facility]  12/12/20 6046

## 2020-12-12 NOTE — BH NOTE
[facility] accepted to  [facility] by SIMI Giron.  Electronically signed by Natalie Pacheco RN on 12/12/2020 at 12:35 PM

## 2020-12-12 NOTE — PROGRESS NOTES
Physician Progress Note      PATIENT:               Junior Farnsworth  CSN #:                  [id number]  :                       1960  ADMIT DATE:       2020 10:05 PM  [address] [place] DATE:  RESPONDING  PROVIDER #:        Eze Casas DO          QUERY TEXT:    Pt admitted with intentional drug overdose. Pt noted to have admission   creatinine 2.4. If possible, after further study, please document in the   progress notes and discharge summary if you are evaluating and/or treating any   of the following: The medical record reflects the following:  Risk Factors: per documentation drug overdose, UTI  Clinical Indicators: admission Bun 26, Cr 2.4, GFR 28, per medicine note    Overdose, purposeful, suicide attempt , Polysubstance abuse, Urinary tract   infection, per H and P  he was found to be hypotensive with a blood pressure   of 52. There he was resuscitated with aggressive fluid hydration. per ED   documentation Acute kidney injury  Treatment: IVF boluses, IVF, lab work monitoring    Thank you  Keri Guevara RN CCDS  [phone]  Options provided:  -- Acute kidney failure  -- Acute kidney failure with acute tubular necrosis  -- Acute kidney injury  -- Other - I will add my own diagnosis  -- Disagree - Not applicable / Not valid  -- Disagree - Clinically unable to determine / Unknown  -- Refer to Clinical Documentation Reviewer    PROVIDER RESPONSE TEXT:    This patient has an Acute kidney injury.     Query created by: Le Vicente on 2020 7:26 AM      Electronically signed by:  Eze Csaas DO 2020 3:38 PM

## 2020-12-12 NOTE — DISCHARGE SUMMARY
Discharge Summary    Date: 12/12/2020  Patient Name: Joelle Lee YOB: 1960 Age: 61 y.o. Admit Date: 12/9/2020  Discharge Date: 6/24/2020  Discharge Condition: Good    Admission Diagnosis  Suicide attempt [facility]) (T14.91XA)     Discharge Diagnosis  Principal Problem: Suicide attempt (HCC)Active Problems: TIA (transient ischemic attack) Nonobstructive atherosclerosis of coronary artery Acute cystitis without hematuria  Essential hypertension  Overdose of antihypertensive agent  Acute kidney injury (HCC)Resolved Problems:  * No resolved hospital problems. [facility] Stay  Narrative of Hospital Course:  61 yr old male with a past history of aortic valve replacement, hyperlipidemia, hx of depression with hx of suicide attempt X 1, presented to the ER via paramedics. He was at a \"drug house\" and was found by friends to be nonresponsive. Paramedics found his blood pressure to be 52 over palp. They gave him fluid resuscitation and brought him in. Pressure improved, normalized while he was in the ER. Patient admitted to overdosing on his cardiac meds Flexeril. Also admits to using cocaine recently. Found to have creatinine of 2.4 upon admission, pre-renal azotemia from hypotension, hypotension was due to overdose on anti-hypertensive. Resolved with IV fluid. 1.0 upon discharge. QT interval initially 520, today is 480. Coreg restarted, Lipitor and aspirin restarted. Pt not to take any more cyclobenzaprine. Did well on cardiac monitor. No dysrhythmias. Liver and renal function normal    Psychiatrist saw the patient yesterday, placed a pink slip. Patient feels like he would kill himself with taking pills again if he left, cannot contract for safety to will be transferred to inpatient behavioral health unit. Found to have pan-sensitive E coli in urine. Needs total 7 days  abx because he is male. Got ceftriaxone, 2 dose while here, will finish out course on ceftin. Consultants:  IP CONSULT TO PSYCHIATRYIP CONSULT TO PSYCHIATRY    Surgeries/procedures Performed:  None     Treatments:    IV Hydration and Antibiotics        Discharge Plan/Disposition:  To Non-[facility]    Hospital/Incidental Findings Requiring Follow Up:    Patient Instructions:    Diet: Cardiac Diet    Activity:Activity as Tolerated  For number of days (if applicable): Other Instructions:    Provider Follow-Up:   No follow-ups on file.      Significant Diagnostic Studies:    Recent Labs: Admission on 12/09/2020Ventricular Rate                              Date: 12/09/2020Value: 67          Ref range: BPM                Status: FinalAtrial Rate                                   Date: 12/09/2020Value: 67          Ref range: BPM                Status: FinalP-R Interval                                  Date: 12/09/2020Value: 142         Ref range: ms                 Status: FinalQRS Duration                                  Date: 12/09/2020Value: 152         Ref range: ms                 Status: FinalQ-T Interval                                  Date: 12/09/2020Value: 520         Ref range: ms                 Status: FinalQTc Calculation (Bazett)                      Date: 12/09/2020Value: 549         Ref range: ms                 Status: FinalP Axis                                        Date: 12/09/2020Value: 11          Ref range: degrees            Status: FinalR Axis                                        Date: 12/09/2020Value: 27          Ref range: degrees            Status: FinalT Axis                                        Date: 12/09/2020Value: 132         Ref range: degrees            Status: [address] [facility]                                           Date: 12/09/2020Value: 9.7         Ref range: 4.5 - 11.5 E9/L    Status: FinalRBC                                           Date: 12/09/2020Value: 4.75        Ref range: 3.80 - 5.80 E12/L  Status: FinalHemoglobin                                    Date: 12/09/2020Value: 14.4        Ref range: 12.5 - 16.5 g/dL   Status: FinalHematocrit                                    Date: 12/09/2020Value: 42.5        Ref range: 37.0 - 54.0 %      Status: FinalMCV                                           Date: 12/09/2020Value: 89.5        Ref range: 80.0 - 99.9 fL     Status: [address] [place]                                           Date: 12/09/2020Value: 30.3        Ref range: 26.0 - 35.0 pg     Status: [address]                                          Date: 12/09/2020Value: 33.9        Ref range: 32.0 - 34.5 %      Status: FinalRDW                                           Date: 12/09/2020Value: 13.1        Ref range: 11.5 - 15.0 fL     Status: FinalPlatelets                                     Date: 12/09/2020Value: 227         Ref range: 130 - 450 E9/L     Status: FinalMPV                                           Date: 12/09/2020Value: 9.4         Ref range: 7.0 - 12.0 fL      Status: FinalNeutrophils %                                 Date: 12/09/2020Value: 71.4        Ref range: 43.0 - 80.0 %      Status: FinalImmature Granulocytes %                       Date: 12/09/2020Value: 0.6         Ref range: 0.0 - 5.0 %        Status: FinalLymphocytes %                                 Date: 12/09/2020Value: 17.0*       Ref range: 20.0 - 42.0 %      Status: FinalMonocytes %                                   Date: 12/09/2020Value: 9.0         Ref range: 2.0 - 12.0 %       Status: FinalEosinophils %                                 Date: 12/09/2020Value: 1.5         Ref range: 0.0 - 6.0 %        Status: FinalBasophils %                                   Date: 12/09/2020Value: 0.5         Ref range: 0.0 - 2.0 %        Status: FinalNeutrophils Absolute                          Date: 12/09/2020Value: 6.94        Ref range: 1.80 - 7.30 E9/L   Status: FinalImmature Granulocytes #                       Date: 12/09/2020Value: 0.06        Ref range: E9/L               Status: FinalLymphocytes Absolute                          Date: 12/09/2020Value: 1.65        Ref range: 1.50 - 4.00 E9/L   Status: FinalMonocytes Absolute                            Date: 12/09/2020Value: 0.87        Ref range: 0.10 - 0.95 E9/L   Status: FinalEosinophils Absolute                          Date: 12/09/2020Value: 0.15        Ref range: 0.05 - 0.50 E9/L   Status: FinalBasophils Absolute                            Date: 12/09/2020Value: 0.05        Ref range: 0.00 - 0.20 E9/L   Status: FinalSodium Date: 12/09/2020Value: 137         Ref range: 132 - 146 mmol/L   Status: FinalPotassium                                     Date: 12/09/2020Value: 4.4         Ref range: 3.5 - 5.0 mmol/L   Status: FinalChloride                                      Date: 12/09/2020Value: 101         Ref range: 98 - 107 mmol/L    Status: FinalCO2                                           Date: 12/09/2020Value: 22          Ref range: 22 - 29 mmol/L     Status: FinalAnion Gap                                     Date: 12/09/2020Value: 14          Ref range: 7 - 16 mmol/L      Status: FinalGlucose                                       Date: 12/09/2020Value: 111*        Ref range: 74 - 99 mg/dL      Status: FinalBUN                                           Date: 12/09/2020Value: 26*         Ref range: 8 - 23 mg/dL       Status: FinalCREATININE                                    Date: 12/09/2020Value: 2.4*        Ref range: 0.7 - 1.2 mg/dL    Status: FinalGFR Non-                      Date: 12/09/2020Value: 28          Ref range: >=60 mL/min/1.73   Status: Final              Comment: Chronic Kidney Disease: less than 60 ml/min/1.73 sq.m. Kidney Failure: less than 15 ml/min/1.73 sq. m. Results valid for patients 18 years and older. GFR                           Date: 12/09/2020Value: 33            Status: FinalCalcium                                       Date: 12/09/2020Value: 9.3         Ref range: 8.6 - 10.2 mg/dL   Status: FinalTotal Protein                                 Date: 12/09/2020Value: 6.9         Ref range: 6.4 - 8.3 g/dL     Status: FinalAlb                                           Date: 12/09/2020Value: 3.5         Ref range: 3.5 - 5.2 g/dL     Status: FinalTotal Bilirubin                               Date: 12/09/2020Value: 0.6         Ref range: 0.0 - 1.2 mg/dL    Status: FinalAlkaline Phosphatase                          Date: 12/09/2020Value: 80 Ref range: Negative < 300ng*  Status: Final              Comment: Note:  The Opiate Screen is not intended to detect Oxycodone. PCP Screen, Urine                             Date: 12/09/2020Value: NOT DETECTED                   Ref range: Negative < 25 ng*  Status: FinalMethadone Screen, Urine                       Date: 12/09/2020Value: NOT DETECTED                   Ref range: Negative <300 ng*  Status: FinalOxycodone Urine                               Date: 12/09/2020Value: NOT DETECTED                   Ref range: Negative <100 ng*  Status: FinalFENTANYL SCREEN, URINE                        Date: 12/09/2020Value: NOT DETECTED                   Ref range: Negative <1 ng/mL  Status: FinalDrug Screen Comment:                          Date: 12/09/2020Value: see below     Status: Final              Comment: These drug screen results are for medical purposes only andshould not be considered definitive or confirmed. The drugmethodology concentration value must be greater than or equalto the cutoff to be reported as positive. Confirmatory testingorders and/or interpretive screening questions can be directedto toxicology at [phone]. The absence of expected drug(s) and/or metabolite(s) may be dueto inappropriate timing of specimen collection relative to drugadministration, poor drug absorption, diluted/adulterated urine,or limitations of screening testing methodology. Total CK                                      Date: 12/09/2020Value: 209*        Ref range: 20 - 200 U/L       Status: FinalWBC, UA                                       Date: 12/09/2020Value: >20*        Ref range: 0 - 5 /HPF         Status: FinalRBC, UA                                       Date: 12/09/2020Value: 0-1         Ref range: 0 - 2 /HPF         Status: FinalBacteria, UA                                  Date: 12/09/2020Value: MANY*       Ref range: None Seen /HPF     Status: FinalOrganism Date: 12/09/2020Value: Escherichia coli                     Status: FinalUrine Culture, Routine                        Date: 12/09/2020Value: >100,000 CFU/ml                     Status: FinalBlood Culture, Routine                        Date: 12/09/2020Value: 24 Hours no growth                     Status: PreliminaryCulture, Blood 2                              Date: 12/10/2020Value: 24 Hours no growth                     Status: PreliminaryLactic Acid                                   Date: 12/10/2020Value: 0.8         Ref range: 0.5 - 2.2 mmol/L   Status: FinalAdenovirus by PCR                             Date: 12/10/2020Value: Not Detected                   Ref range: Not Detected       Status: FinalBordetella parapertussis by PCR               Date: 12/10/2020Value: Not Detected                   Ref range: Not Detected       Status: FinalBordetella pertussis by PCR                   Date: 12/10/2020Value: Not Detected                   Ref range: Not Detected       Status: FinalChlamydophilia pneumoniae by PCR              Date: 12/10/2020Value: Not Detected                   Ref range: Not Detected       Status: FinalCoronavirus 229E by PCR                       Date: 12/10/2020Value: Not Detected                   Ref range: Not Detected       Status: FinalCoronavirus HKU1 by PCR                       Date: 12/10/2020Value: Not Detected                   Ref range: Not Detected       Status: FinalCoronavirus NL63 by PCR                       Date: 12/10/2020Value: Not Detected                   Ref range: Not Detected       Status: FinalCoronavirus OC43 by PCR                       Date: 12/10/2020Value: Not Detected                   Ref range: Not Detected       Status: AvbuxQTHF-KvX-3, PCR                               Date: 12/10/2020Value: Not Detected                   Ref range: Not Detected       Status: Final              Comment:  This test has been authorized by FDA under anEmergency Use Status: FinalVentricular Rate                              Date: 12/11/2020Value: 74          Ref range: BPM                Status: FinalAtrial Rate                                   Date: 12/11/2020Value: 74          Ref range: BPM                Status: FinalP-R Interval                                  Date: 12/11/2020Value: 152         Ref range: ms                 Status: FinalQRS Duration                                  Date: 12/11/2020Value: 158         Ref range: ms                 Status: FinalQ-T Interval                                  Date: 12/11/2020Value: 440         Ref range: ms                 Status: FinalQTc Calculation (Bazett)                      Date: 12/11/2020Value: 488         Ref range: ms                 Status: FinalP Axis                                        Date: 12/11/2020Value: 23          Ref range: degrees            Status: FinalR Axis                                        Date: 12/11/2020Value: 2           Ref range: degrees            Status: FinalT Axis                                        Date: 12/11/2020Value: 155         Ref range: degrees            Status: FinalSodium                                        Date: 12/11/2020Value: 135         Ref range: 132 - 146 mmol/L   Status: FinalPotassium                                     Date: 12/11/2020Value: 4.8         Ref range: 3.5 - 5.0 mmol/L   Status: FinalChloride                                      Date: 12/11/2020Value: 103         Ref range: 98 - 107 mmol/L    Status: FinalCO2                                           Date: 12/11/2020Value: 23          Ref range: 22 - 29 mmol/L     Status: FinalAnion Gap                                     Date: 12/11/2020Value: 9           Ref range: 7 - 16 mmol/L      Status: FinalGlucose                                       Date: 12/11/2020Value: 107*        Ref range: 74 - 99 mg/dL      Status: FinalBUN                                           Date: 12/11/2020Value: 16          Ref range: 8 - 23 mg/dL       Status: FinalCREATININE                                    Date: 12/11/2020Value: 1.0         Ref range: 0.7 - 1.2 mg/dL    Status: FinalGFR Non-                      Date: 12/11/2020Value: >60         Ref range: >=60 mL/min/1.73   Status: Final              Comment: Chronic Kidney Disease: less than 60 ml/min/1.73 sq.m. Kidney Failure: less than 15 ml/min/1.73 sq. m. Results valid for patients 18 years and older. GFR                           Date: 12/11/2020Value: >60           Status: FinalCalcium                                       Date: 12/11/2020Value: 9.0         Ref range: 8.6 - 10.2 mg/dL   Status: FinalTotal Protein                                 Date: 12/11/2020Value: 6.5         Ref range: 6.4 - 8.3 g/dL     Status: FinalAlb                                           Date: 12/11/2020Value: 3.3*        Ref range: 3.5 - 5.2 g/dL     Status: FinalTotal Bilirubin                               Date: 12/11/2020Value: 0.2         Ref range: 0.0 - 1.2 mg/dL    Status: FinalAlkaline Phosphatase                          Date: 12/11/2020Value: 71          Ref range: 40 - 129 U/L       Status: FinalALT                                           Date: 12/11/2020Value: 9           Ref range: 0 - 40 U/L         Status: FinalAST                                           Date: 12/11/2020Value: 14          Ref range: 0 - 39 U/L         Status: [address] [facility]                                           Date: 12/11/2020Value: 6.0         Ref range: 4.5 - 11.5 E9/L    Status: FinalRBC                                           Date: 12/11/2020Value: 4.39        Ref range: 3.80 - 5.80 E12/L  Status: FinalHemoglobin                                    Date: 12/11/2020Value: 13.3        Ref range: 12.5 - 16.5 g/dL   Status: FinalHematocrit                                    Date: 12/11/2020Value: 39.9        Ref range: 37.0 - 54.0 %      Status: Jamey Asper Date: 12/11/2020Value: 90.9        Ref range: 80.0 - 99.9 fL     Status: [facility]                                           Date: 12/11/2020Value: 30.3        Ref range: 26.0 - 35.0 pg     Status: [address]                                          Date: 12/11/2020Value: 33.3        Ref range: 32.0 - 34.5 %      Status: FinalRDW                                           Date: 12/11/2020Value: 13.1        Ref range: 11.5 - 15.0 fL     Status: FinalPlatelets                                     Date: 12/11/2020Value: 207         Ref range: 130 - 450 E9/L     Status: FinalMPV                                           Date: 12/11/2020Value: 9.1         Ref range: 7.0 - 12.0 fL      Status: FinalVentricular Rate                              Date: 12/12/2020Value: 70          Ref range: BPM                Status: IncompleteAtrial Rate                                   Date: 12/12/2020Value: 70          Ref range: BPM                Status: IncompleteP-R Interval                                  Date: 12/12/2020Value: 150         Ref range: ms                 Status: IncompleteQRS Duration                                  Date: 12/12/2020Value: 160         Ref range: ms                 Status: IncompleteQ-T Interval                                  Date: 12/12/2020Value: 480         Ref range: ms                 Status: IncompleteQTc Calculation (Bazett)                      Date: 12/12/2020Value: 518         Ref range: ms                 Status: IncompleteP Axis                                        Date: 12/12/2020Value: 17          Ref range: degrees            Status: IncompleteR Axis                                        Date: 12/12/2020Value: -10         Ref range: degrees            Status: IncompleteT Axis                                        Date: 12/12/2020Value: 94          Ref range: degrees            Status: Incomplete------------    Radiology last 7 days: Xr Hip Right (2-3 Views)Result Date: 12/9/20201. No acute osseous findings in the pelvis or hips on this exam. 2.  Bilateral SI joint and mild bilateral hip joint degenerative changes. RECOMMENDATION: (Negative radiographs should not deter from obtaining cross-sectional imaging if there is high concern for an acute osseous injury.)Ct Head Wo ContrastResult Date: 12/9/2020CT of the head: 1. No skull fracture or acute intracranial abnormality. 2. 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 CT cervical spine: 1. No fracture or joint dislocation is seen. 2. Degenerative changes, as described. Ct Cervical Spine Wo ContrastResult Date: 12/9/2020CT of the head: 1. No skull fracture or acute intracranial abnormality. 2. 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 CT cervical spine: 1. No fracture or joint dislocation is seen. 2. Degenerative changes, as described. Xr Chest 1 ViewResult Date: 12/9/2020Postsurgical changes. Minimal atherosclerotic disease. No additional cardiopulmonary pathology identified. Cta Chest W ContrastResult Date: 12/10/83614. No scan evidence for pulmonary embolus. 2. Central right lower lobe nodule may represent an intrapulmonary lymph node. See follow up recommendations below. RECOMMENDATIONS: Fleischner Society guidelines for follow-up and management of incidentally detected pulmonary nodules: Single Solid Nodule: Nodule size equals 6-8 mm In a low-risk patient, CT at 6-12 months, then consider CT at 18-24 months. In a high-risk patient, CT at 6-12 months, then CT at 18-24 months. - Low risk patients include individuals with minimal or absent history of smoking and other known risk factors. - High risk patients include individuals with a history or smoking or known risk factors. Radiology 2017 http://pubs. rsna.org/doi/full/10.1148/radiol. 0810179020 Pending Labs   Order Current Status  Culture, Blood 1 Preliminary result  Culture, Blood 2 Preliminary result      Discharge Medications    Current Discharge Medication ListSTART taking these medicationscefUROXime (CEFTIN) 250 MG tabletTake 1 tablet by mouth every 12 hours for 5 daysQty: 10 tablet Refills: 0polyethylene glycol (GLYCOLAX) 17 g packetTake 17 g by mouth daily as needed for ConstipationQty: 527 g Refills: 1    Current Discharge Medication ListCONTINUE these medications which have CHANGEDaspirin 325 MG EC tabletTake 1 tablet by mouth dailyQty: 30 tablet Refills: 0lisinopril (PRINIVIL;ZESTRIL) 5 MG tabletTake 1 tablet by mouth dailyQty: 30 tablet Refills: 3    Current Discharge Medication ListCONTINUE these medications which have NOT CHANGEDalbuterol sulfate HFA (VENTOLIN HFA) 108 (90 Base) MCG/ACT inhalerInhale 2 puffs into the lungs every 6 hours as needed for Wheezing or Shortness of Breathvitamin D (ERGOCALCIFEROL) 1.25 MG (82124 UT) CAPS capsuleTake 50,000 Units by mouth once a week Tuesdayzolpidem (AMBIEN) 5 MG tabletTake 5 mg by mouth nightly as needed for Sleep. atorvastatin (LIPITOR) 40 MG tabletTake 1 tablet by mouth nightlyQty: 30 tablet Refills: 3nitroGLYCERIN (NITROSTAT) 0.4 MG SL tabletup to max of 3 total doses. If no relief after 1 dose, call 911. Qty: 25 tablet Refills: 3carvedilol (COREG) 12.5 MG tabletTake 12.5 mg by mouth 2 times daily     Current Discharge Medication ListSTOP taking these medicationscyclobenzaprine (FLEXERIL) 5 MG tabletComments:Reason for Stopping:oxyCODONE-acetaminophen (PERCOCET) 5-325 MG per tabletComments:Reason for Stopping:    Time Spent on Discharge:2E] minutes were spent in patient examination, evaluation, counseling as well as medication reconciliation, prescriptions for required medications, discharge plan, and follow up.     Electronically signed by Ian Almazan DO on 12/12/20 at 3:40 PM EST     Discharge Exam: Alert, in no acute distress, affect full, not in respiratory distress, sitting comfortably in the bed, not requiring oxygen gives his own history  Eyes and mouth clear moist tongue and uvula midline no JVD, neck supple  S1 and S2 with regular rate and rhythm no murmurs rubs gallops  Lungs clear to auscultation bilaterally no wheezes rhonchi rales  Abdomen soft nontender nondistended normoactive bowel sounds no hepatomegaly  No lower extreme edema, positive of pedal pulses, no cyanosis or clubbing, good cap refill  Skin warm and dry no rashes good turgor no jaundice

## 2020-12-13 PROBLEM — F14.10 COCAINE ABUSE (HCC): Status: ACTIVE | Noted: 2020-12-13

## 2020-12-13 PROBLEM — F31.81 BIPOLAR 2 DISORDER, MAJOR DEPRESSIVE EPISODE (HCC): Status: ACTIVE | Noted: 2020-12-13

## 2020-12-13 PROCEDURE — 99222 1ST HOSP IP/OBS MODERATE 55: CPT | Performed by: PSYCHIATRY & NEUROLOGY

## 2020-12-13 PROCEDURE — 93005 ELECTROCARDIOGRAM TRACING: CPT | Performed by: PSYCHIATRY & NEUROLOGY

## 2020-12-13 PROCEDURE — 6370000000 HC RX 637 (ALT 250 FOR IP): Performed by: PSYCHIATRY & NEUROLOGY

## 2020-12-13 PROCEDURE — 1240000000 HC EMOTIONAL WELLNESS R&B

## 2020-12-13 RX ORDER — ALBUTEROL SULFATE 2.5 MG/3ML
2.5 SOLUTION RESPIRATORY (INHALATION) EVERY 6 HOURS PRN
Status: DISCONTINUED | OUTPATIENT
Start: 2020-12-13 | End: 2020-12-17 | Stop reason: HOSPADM

## 2020-12-13 RX ORDER — LANOLIN ALCOHOL/MO/W.PET/CERES
6 CREAM (GRAM) TOPICAL NIGHTLY PRN
Status: DISCONTINUED | OUTPATIENT
Start: 2020-12-13 | End: 2020-12-15

## 2020-12-13 RX ORDER — POLYETHYLENE GLYCOL 3350 17 G/17G
17 POWDER, FOR SOLUTION ORAL DAILY PRN
Status: DISCONTINUED | OUTPATIENT
Start: 2020-12-13 | End: 2020-12-17 | Stop reason: HOSPADM

## 2020-12-13 RX ORDER — ATORVASTATIN CALCIUM 40 MG/1
40 TABLET, FILM COATED ORAL NIGHTLY
Status: DISCONTINUED | OUTPATIENT
Start: 2020-12-13 | End: 2020-12-17 | Stop reason: HOSPADM

## 2020-12-13 RX ORDER — OXCARBAZEPINE 150 MG/1
150 TABLET, FILM COATED ORAL 2 TIMES DAILY
Status: DISCONTINUED | OUTPATIENT
Start: 2020-12-13 | End: 2020-12-15

## 2020-12-13 RX ORDER — ERGOCALCIFEROL 1.25 MG/1
50000 CAPSULE ORAL WEEKLY
Status: DISCONTINUED | OUTPATIENT
Start: 2020-12-15 | End: 2020-12-17 | Stop reason: HOSPADM

## 2020-12-13 RX ORDER — LISINOPRIL 10 MG/1
5 TABLET ORAL DAILY
Status: DISCONTINUED | OUTPATIENT
Start: 2020-12-13 | End: 2020-12-17 | Stop reason: HOSPADM

## 2020-12-13 RX ORDER — CARVEDILOL 6.25 MG/1
12.5 TABLET ORAL 2 TIMES DAILY
Status: DISCONTINUED | OUTPATIENT
Start: 2020-12-13 | End: 2020-12-17 | Stop reason: HOSPADM

## 2020-12-13 RX ORDER — CEFUROXIME AXETIL 250 MG/1
250 TABLET ORAL EVERY 12 HOURS SCHEDULED
Status: COMPLETED | OUTPATIENT
Start: 2020-12-13 | End: 2020-12-16

## 2020-12-13 RX ORDER — ERGOCALCIFEROL 1.25 MG/1
50000 CAPSULE ORAL WEEKLY
Status: DISCONTINUED | OUTPATIENT
Start: 2020-12-13 | End: 2020-12-13

## 2020-12-13 RX ADMIN — CEFUROXIME AXETIL 250 MG: 250 TABLET ORAL at 21:37

## 2020-12-13 RX ADMIN — OXCARBAZEPINE 150 MG: 300 TABLET, FILM COATED ORAL at 21:36

## 2020-12-13 RX ADMIN — OXCARBAZEPINE 150 MG: 300 TABLET, FILM COATED ORAL at 11:37

## 2020-12-13 RX ADMIN — CARVEDILOL 12.5 MG: 6.25 TABLET, FILM COATED ORAL at 21:37

## 2020-12-13 RX ADMIN — LISINOPRIL 5 MG: 10 TABLET ORAL at 21:38

## 2020-12-13 RX ADMIN — ASPIRIN 325 MG: 325 TABLET, COATED ORAL at 21:37

## 2020-12-13 RX ADMIN — ATORVASTATIN CALCIUM 40 MG: 40 TABLET, FILM COATED ORAL at 21:37

## 2020-12-13 ASSESSMENT — SLEEP AND FATIGUE QUESTIONNAIRES
DIFFICULTY ARISING: NO
RESTFUL SLEEP: NO
SLEEP PATTERN: DIFFICULTY FALLING ASLEEP
DO YOU HAVE DIFFICULTY SLEEPING: YES
AVERAGE NUMBER OF SLEEP HOURS: 5
DIFFICULTY STAYING ASLEEP: YES
DIFFICULTY FALLING ASLEEP: YES
DO YOU USE A SLEEP AID: YES

## 2020-12-13 ASSESSMENT — LIFESTYLE VARIABLES: HISTORY_ALCOHOL_USE: YES

## 2020-12-13 ASSESSMENT — PATIENT HEALTH QUESTIONNAIRE - PHQ9: SUM OF ALL RESPONSES TO PHQ QUESTIONS 1-9: 25

## 2020-12-13 ASSESSMENT — PAIN SCALES - GENERAL
PAINLEVEL_OUTOF10: 0
PAINLEVEL_OUTOF10: 0

## 2020-12-13 NOTE — H&P
PSYCHIATRIC EVALUATION      CHIEF COMPLAINT: \" I am just tired of dealing with everything in my family and I am not sure if I am happy that I am still alive\"    HISTORY OF PRESENT ILLNESS: Carla Perez  is a 61 y.o.   man living with the girlfriend for last 1 year, moved to [place] from [place] after open heart surgery, on Social Security disability, history of depression but untreated for many many years, had 1 inpatient psychiatric hospitalization in [place] in 1992 for suicidal attempt, admitted to [facility] ICU after he overdosed on Ambien antihypertensive drugs and Flexeril. His urine tox was positive for cocaine. He has history of several overdose attempts in the past.  He is not seeing anybody in the community. He was seen by her psychiatrist at [facility] and recommended admission to inpatient care. Patient came to [facility] last night after medical clearance. However his QTC was 517 and he has multiple other coronary artery disease status post open heart surgery.  Aortic stenosis     CAD (coronary artery disease)     Cerebral artery occlusion with cerebral infarction (HCC)     Hyperlipidemia     Hypertension     Suicide attempt ([facility] [address]     Unspecified cerebral artery occlusion with cerebral infarction        MEDICAL ROS: All review are negative except what is stated in HPI. ALLERGIES: Patient has no known allergies. FAMILY PSYCHIATRIC HISTORY:  Stated that he is not aware of any significant mental illness in the family    Personal family and social history   Patient was born in [facility] but grew up here. His parents were  when he was young. He has a brother and sister that call him but he doesn't see them much. Patient was  once and . He has one son who is 36 and lives out of state. Patient was in the '[employer] for 20 years but currently is on disability due to his heart issues. He reports living alone in a small apartment but sometimes stays with a female friend.     SUBSTANCE ABUSE HISTORY: Patient admits to intermittent alcohol and cocaine use.  He has had previous treatment at the [address] [place] [address] several years ago.           VITALS: /85   Pulse 80   Temp 97.6 °F (36.4 °C) (Temporal)   Resp 16   Ht 5' 8\" (1.727 m)   Wt 160 lb (72.6 kg)   SpO2 96%   BMI 24.33 kg/m²      Physical Examination:     Head: x  Atraumatic: x normocephalic  Skin and Mucosa        Moist x  Dry   Pale  x Normal   Neck:  Thyroid  Palpable   x  Not palpable   venus distention   adenopathy   Chest: x Clear   Rhonchi     Wheezing   CV:  xS1   xS2    xNo murmer   Abdomen:  x  Soft    Tender    Viceromegaly   Extremities:  x No Edema     Edema     Cranial Nerves Examination:     CN II:   xPupils are reactive to light  Pupils are non reactive to light  CN III, IV, VI:  xNo eye deviation    No diplopia or ptosis   CN V:    xFacial Sensation is intact     Facial Sensation is not intact   CN IIIV:   x Hearing is normal to rubbing fingers CN IX, X:     xNormal gag reflex and phonation   CN XI:   xShoulder shrug and neck rotation is normal  CNXII:    xTongue is midline no deviation or atrophy     For further PE refer to ED note      MENTAL STATUS EXAM:   Mental status exam revealed a 30-year-old  man casually dressed appears stated age calm cooperative little blunted. Psychomotor revealed no agitation retardation or any other abnormal movement. Eye contact was poor. Speech was decreased in tone. Mood \"I am just tired to deal with all this\", affect is blunted anxious and tired looking. Thought process linear without flight of ideas or looseness of position. Thought contents are devoid of any overt signs of psychosis saleem hypomania but feels hopeless worthless guilty. Thought contents are devoid of auditory visual hallucination delusion or any other perceptual abnormalities. He said he is not sure whether he is happy to survive this overdose. He denies any active suicidal plan but he still believes that he will be better off being dead. Denies homicidal ideation. Impulse control poor. Cognitive function seem to be at the baseline. Insight and judgment poor. Alert and oriented pleasant person.       LABS:   Admission on 12/09/2020, Discharged on 12/12/2020   Component Date Value Ref Range Status    Ventricular Rate 12/09/2020 67  BPM Final    Atrial Rate 12/09/2020 67  BPM Final    P-R Interval 12/09/2020 142  ms Final    QRS Duration 12/09/2020 152  ms Final    Q-T Interval 12/09/2020 520  ms Final    QTc Calculation (Bazett) 12/09/2020 549  ms Final    P Axis 12/09/2020 11  degrees Final    R Axis 12/09/2020 27  degrees Final    T Axis 12/09/2020 132  degrees Final    WBC 12/09/2020 9.7  4.5 - 11.5 E9/L Final    RBC 12/09/2020 4.75  3.80 - 5.80 E12/L Final    Hemoglobin 12/09/2020 14.4  12.5 - 16.5 g/dL Final    Hematocrit 12/09/2020 42.5  37.0 - 54.0 % Final    MCV 12/09/2020 89.5  80.0 - 99.9 fL Final  MCH 12/09/2020 30.3  26.0 - 35.0 pg Final    MCHC 12/09/2020 33.9  32.0 - 34.5 % Final    RDW 12/09/2020 13.1  11.5 - 15.0 fL Final    Platelets 41/51/6109 227  130 - 450 E9/L Final    MPV 12/09/2020 9.4  7.0 - 12.0 fL Final    Neutrophils % 12/09/2020 71.4  43.0 - 80.0 % Final    Immature Granulocytes % 12/09/2020 0.6  0.0 - 5.0 % Final    Lymphocytes % 12/09/2020 17.0* 20.0 - 42.0 % Final    Monocytes % 12/09/2020 9.0  2.0 - 12.0 % Final    Eosinophils % 12/09/2020 1.5  0.0 - 6.0 % Final    Basophils % 12/09/2020 0.5  0.0 - 2.0 % Final    Neutrophils Absolute 12/09/2020 6.94  1.80 - 7.30 E9/L Final    Immature Granulocytes # 12/09/2020 0.06  E9/L Final    Lymphocytes Absolute 12/09/2020 1.65  1.50 - 4.00 E9/L Final    Monocytes Absolute 12/09/2020 0.87  0.10 - 0.95 E9/L Final    Eosinophils Absolute 12/09/2020 0.15  0.05 - 0.50 E9/L Final    Basophils Absolute 12/09/2020 0.05  0.00 - 0.20 E9/L Final    Sodium 12/09/2020 137  132 - 146 mmol/L Final    Potassium 12/09/2020 4.4  3.5 - 5.0 mmol/L Final    Chloride 12/09/2020 101  98 - 107 mmol/L Final    CO2 12/09/2020 22  22 - 29 mmol/L Final    Anion Gap 12/09/2020 14  7 - 16 mmol/L Final    Glucose 12/09/2020 111* 74 - 99 mg/dL Final    BUN 12/09/2020 26* 8 - 23 mg/dL Final    CREATININE 12/09/2020 2.4* 0.7 - 1.2 mg/dL Final    GFR Non- 12/09/2020 28  >=60 mL/min/1.73 Final    Comment: Chronic Kidney Disease: less than 60 ml/min/1.73 sq.m. Kidney Failure: less than 15 ml/min/1.73 sq.m. Results valid for patients 18 years and older.       GFR  12/09/2020 33   Final    Calcium 12/09/2020 9.3  8.6 - 10.2 mg/dL Final    Total Protein 12/09/2020 6.9  6.4 - 8.3 g/dL Final    Alb 12/09/2020 3.5  3.5 - 5.2 g/dL Final    Total Bilirubin 12/09/2020 0.6  0.0 - 1.2 mg/dL Final    Alkaline Phosphatase 12/09/2020 83  40 - 129 U/L Final    ALT 12/09/2020 11  0 - 40 U/L Final  AST 12/09/2020 22  0 - 39 U/L Final    Specimen is slightly Hemolyzed. Result may be artificially increased.  Troponin 12/09/2020 <0.01  0.00 - 0.03 ng/mL Final    Comment: TROPONIN T BLOOD LEVELS:         0.03 ng/mL     Upper Reference Limit  0.04 - 0.09 ng/mL     Possible myocardial injury      >= 0.10 ng/mL     Myocardial injury      D-Dimer, Quant 12/09/2020 2395  ng/mL DDU Final    Comment: D-DIMER Interpretation:  <  230  ng/mL (D-DU)  Indicates low probability for PE/DVT   = 232  ng/mL (D-DU)  Upper Limit of Normal  >= 4000 ng/mL (D-DU)  This level could suggest the presence                        of DIC. Clinical correlation may be                        helpful.       Ethanol Lvl 12/09/2020 <10  mg/dL Final    Not Detected    Acetaminophen Level 12/09/2020 <5.0* 10.0 - 30.0 mcg/mL Final    Salicylate, Serum 38/73/1847 <0.3  0.0 - 30.0 mg/dL Final    TCA Scrn 12/09/2020 NEGATIVE  Cutoff:300 ng/mL Final    Color, UA 12/09/2020 Yellow  Straw/Yellow Final    Clarity, UA 12/09/2020 TURBID* Clear Final    Glucose, Ur 12/09/2020 Negative  Negative mg/dL Final    Bilirubin Urine 12/09/2020 Negative  Negative Final    Ketones, Urine 12/09/2020 Negative  Negative mg/dL Final    Specific [place], UA 12/09/2020 1.015  1.005 - 1.030 Final    Blood, Urine 12/09/2020 SMALL* Negative Final    pH, UA 12/09/2020 7.5  5.0 - 9.0 Final    Protein, UA 12/09/2020 100* Negative mg/dL Final    Urobilinogen, Urine 12/09/2020 0.2  <2.0 E.U./dL Final    Nitrite, Urine 12/09/2020 POSITIVE* Negative Final    Leukocyte Esterase, Urine 12/09/2020 MODERATE* Negative Final    Amphetamine Screen, Urine 12/09/2020 NOT DETECTED  Negative <1000 ng/mL Final    Barbiturate Screen, Ur 12/09/2020 NOT DETECTED  Negative < 200 ng/mL Final    Benzodiazepine Screen, Urine 12/09/2020 NOT DETECTED  Negative < 200 ng/mL Final    Cannabinoid Scrn, Ur 12/09/2020 NOT DETECTED  Negative < 50ng/mL Final  Cocaine Metabolite Screen, Urine 12/09/2020 POSITIVE* Negative < 300 ng/mL Final    Opiate Scrn, Ur 12/09/2020 NOT DETECTED  Negative < 300ng/mL Final    Note:  The Opiate Screen is not intended to detect Oxycodone.  PCP Screen, Urine 12/09/2020 NOT DETECTED  Negative < 25 ng/mL Final    Methadone Screen, Urine 12/09/2020 NOT DETECTED  Negative <300 ng/mL Final    Oxycodone Urine 12/09/2020 NOT DETECTED  Negative <100 ng/mL Final    FENTANYL SCREEN, URINE 12/09/2020 NOT DETECTED  Negative <1 ng/mL Final    Drug Screen Comment: 12/09/2020 see below   Final    Comment: These drug screen results are for medical purposes only and  should not be considered definitive or confirmed. The drug  methodology concentration value must be greater than or equal  to the cutoff to be reported as positive. Confirmatory testing  orders and/or interpretive screening questions can be directed  to toxicology at [phone]. The absence of expected drug(s) and/or metabolite(s) may be due  to inappropriate timing of specimen collection relative to drug  administration, poor drug absorption, diluted/adulterated urine,  or limitations of screening testing methodology.       Total CK 12/09/2020 209* 20 - 200 U/L Final    WBC, UA 12/09/2020 >20* 0 - 5 /HPF Final    RBC, UA 12/09/2020 0-1  0 - 2 /HPF Final    Bacteria, UA 12/09/2020 MANY* None Seen /HPF Final    Organism 12/09/2020 Escherichia coli*  Final    Urine Culture, Routine 12/09/2020 >100,000 CFU/ml   Final    Blood Culture, Routine 12/09/2020 24 Hours no growth   Preliminary    Culture, Blood 2 12/10/2020 24 Hours no growth   Preliminary    Lactic Acid 12/10/2020 0.8  0.5 - 2.2 mmol/L Final    Adenovirus by PCR 12/10/2020 Not Detected  Not Detected Final    Bordetella parapertussis by PCR 12/10/2020 Not Detected  Not Detected Final    Bordetella pertussis by PCR 12/10/2020 Not Detected  Not Detected Final  Chlamydophilia pneumoniae by PCR 12/10/2020 Not Detected  Not Detected Final    Coronavirus 229E by PCR 12/10/2020 Not Detected  Not Detected Final    Coronavirus HKU1 by PCR 12/10/2020 Not Detected  Not Detected Final    Coronavirus NL63 by PCR 12/10/2020 Not Detected  Not Detected Final    Coronavirus OC43 by PCR 12/10/2020 Not Detected  Not Detected Final    SARS-CoV-2, PCR 12/10/2020 Not Detected  Not Detected Final    Comment: This test has been authorized by FDA under an  Emergency Use Authorization (EUA). This test is only authorized for the duration of the  time of declaration that circumstances exist justifying the  authorization of the emergency use of in vitro diagnostic  testing for detection of the SARS-CoV-2 virus  and/or diagnosis of COVID-19 infection under  section 564 (b)(1) of the Act, 21 U. S.C. 682LUZ-4 (b) (1),  unless the authorization is terminated or revoked sooner.     Patient Fact SettlementContracts.gl  Provider Fact SettlementContracts.gl    METHODOLOGY: Multiplex PCR      Human Metapneumovirus by PCR 12/10/2020 Not Detected  Not Detected Final    Human Rhinovirus/Enterovirus by PCR 12/10/2020 Not Detected  Not Detected Final    Influenza A by PCR 12/10/2020 Not Detected  Not Detected Final    Influenza B by PCR 12/10/2020 Not Detected  Not Detected Final    Mycoplasma pneumoniae by PCR 12/10/2020 Not Detected  Not Detected Final    Parainfluenza Virus 1 by PCR 12/10/2020 Not Detected  Not Detected Final    Parainfluenza Virus 2 by PCR 12/10/2020 Not Detected  Not Detected Final    Parainfluenza Virus 3 by PCR 12/10/2020 Not Detected  Not Detected Final    Parainfluenza Virus 4 by PCR 12/10/2020 Not Detected  Not Detected Final    Respiratory Syncytial Virus by PCR 12/10/2020 Not Detected  Not Detected Final    Ventricular Rate 12/11/2020 74  BPM Final    Atrial Rate 12/11/2020 74  BPM Final  P-R Interval 12/11/2020 152  ms Final    QRS Duration 12/11/2020 158  ms Final    Q-T Interval 12/11/2020 440  ms Final    QTc Calculation (Bazett) 12/11/2020 488  ms Final    P Axis 12/11/2020 23  degrees Final    R Axis 12/11/2020 2  degrees Final    T Axis 12/11/2020 155  degrees Final    Sodium 12/11/2020 135  132 - 146 mmol/L Final    Potassium 12/11/2020 4.8  3.5 - 5.0 mmol/L Final    Chloride 12/11/2020 103  98 - 107 mmol/L Final    CO2 12/11/2020 23  22 - 29 mmol/L Final    Anion Gap 12/11/2020 9  7 - 16 mmol/L Final    Glucose 12/11/2020 107* 74 - 99 mg/dL Final    BUN 12/11/2020 16  8 - 23 mg/dL Final    CREATININE 12/11/2020 1.0  0.7 - 1.2 mg/dL Final    GFR Non- 12/11/2020 >60  >=60 mL/min/1.73 Final    Comment: Chronic Kidney Disease: less than 60 ml/min/1.73 sq.m. Kidney Failure: less than 15 ml/min/1.73 sq.m. Results valid for patients 18 years and older.       GFR  12/11/2020 >60   Final    Calcium 12/11/2020 9.0  8.6 - 10.2 mg/dL Final    Total Protein 12/11/2020 6.5  6.4 - 8.3 g/dL Final    Alb 12/11/2020 3.3* 3.5 - 5.2 g/dL Final    Total Bilirubin 12/11/2020 0.2  0.0 - 1.2 mg/dL Final    Alkaline Phosphatase 12/11/2020 71  40 - 129 U/L Final    ALT 12/11/2020 9  0 - 40 U/L Final    AST 12/11/2020 14  0 - 39 U/L Final    WBC 12/11/2020 6.0  4.5 - 11.5 E9/L Final    RBC 12/11/2020 4.39  3.80 - 5.80 E12/L Final    Hemoglobin 12/11/2020 13.3  12.5 - 16.5 g/dL Final    Hematocrit 12/11/2020 39.9  37.0 - 54.0 % Final    MCV 12/11/2020 90.9  80.0 - 99.9 fL Final    MCH 12/11/2020 30.3  26.0 - 35.0 pg Final    MCHC 12/11/2020 33.3  32.0 - 34.5 % Final    RDW 12/11/2020 13.1  11.5 - 15.0 fL Final    Platelets 12/23/5406 207  130 - 450 E9/L Final    MPV 12/11/2020 9.1  7.0 - 12.0 fL Final    Ventricular Rate 12/12/2020 70  BPM Incomplete    Atrial Rate 12/12/2020 70  BPM Incomplete    P-R Interval 12/12/2020 150  ms Incomplete  QRS Duration 12/12/2020 160  ms Incomplete    Q-T Interval 12/12/2020 480  ms Incomplete    QTc Calculation (Bazett) 12/12/2020 518  ms Incomplete    P Axis 12/12/2020 17  degrees Incomplete    R Axis 12/12/2020 -10  degrees Incomplete    T Axis 12/12/2020 94  degrees Incomplete           MEDICATIONS: Current Facility-Administered Medications: OXcarbazepine (TRILEPTAL) tablet 150 mg, 150 mg, Oral, BID  melatonin tablet 6 mg, 6 mg, Oral, Nightly PRN  acetaminophen (TYLENOL) tablet 650 mg, 650 mg, Oral, Q4H PRN  magnesium hydroxide (MILK OF MAGNESIA) 400 MG/5ML suspension 30 mL, 30 mL, Oral, Daily PRN  aluminum & magnesium hydroxide-simethicone (MAALOX) 200-200-20 MG/5ML suspension 30 mL, 30 mL, Oral, PRN  haloperidol lactate (HALDOL) injection 3 mg, 3 mg, Intramuscular, Q6H PRN **OR** haloperidol (HALDOL) tablet 3 mg, 3 mg, Oral, Q6H PRN     ASSESSMENT:   Bipolar disorder type II, major depressive episode  Cocaine abuse    PLAN:   Collateral information  Group  Cuellar-milieu  Psycho-education  Discussed the assessment treatment plan and community plan for follow-up and also discussed and educated the risk benefits side effect possible outcome and alternative of the medication. I educated him that he is somebody who is vulnerable to have cardiac arrhythmia due to psychotropic medication and we have to be very careful prescribing the medication since his QTC remains high consistently over 500. I recommended starting Trileptal 150 mg twice daily and titrate as clinically indicated for mood stability. I also told him that treating him with antidepressant medication may trigger the manic phase and which is not the standard treatment. But if the clinical team finds that depression is getting worse and persistent we may start a small dose of anti-depressant medication. Patient also has problem with cocaine abuse and I discussed the deleterious effect of cocaine on his cardiac status, although he may tries to minimize the cocaine use  I educated the patient if he chooses to continue use the cocaine or any other drugs, he may potentially act out impulsively, resulting in serious harm to self or others, even though unintentional  I also counseled him that mental health treatment cannot be optimized with ongoing use of drugs  He demonstrated understanding and has the capacity to understand that  He also consented the treatment that I recommended  I also recommended that he is hooked up with the community because of the high risk and also refer for drug counseling on outpatient or inpatient basis.   Medical to follow-up with any acute medical issues   Expected length of stay 3 to 5 days based on stability    Signed:  Radha Chavez  12/13/2020  10:29 AM

## 2020-12-13 NOTE — CARE COORDINATION
Biopsychosocial Assessment Note    Social work met with patient to complete the biopsychosocial assessment and CSSR-S. Pt was pleasant and cooperative to the interview  Mental Status Exam:  Pt is alert and oriented x4  Mood depresses flat affect  Speech normal    Chief Complaint: Pt was admitted to Ed after he OD on his PO medications    Patient Report: Pt reports he is feeling overwhelmed and can see no reason to live. Pt report  he has been drinking more and  made  A suicide attempt. Gender  [x] Male [] Female [] Transgender  [] Other    Sexual Orientation    [x] Heterosexual [] Homosexual [] Bisexual [] Other    Suicidal Ideation  [x] Reports [] Denies    Homicidal Ideation  [] Reports [x] Denies      Hallucinations/Delusions (Specify type)  [x] Reports [] Denies     Substance Use/Alcohol Use/Addiction  [x] Reports [] Denies     Trauma History  [x] Reports [] Denies     Collateral Contact (SHABBIR signed)  Name: Twilla Pontiff  Relationship: Friend  Number:     Collateral Information:   Twilla Pontiff    Access to Weapons: None    Follow up provider: medical doctor    Plan for discharge (where they live can they return): Pt plans to return home upon discharge.

## 2020-12-13 NOTE — PLAN OF CARE
Pt denies SI HI and hallucinations. Pt is pleasant, flat, blunt, intermittently evasive. Pt is medication compliant. Pt is out on the unit and social with staff and peers. Pt is eating provided meals. Pt will sit in [place] [id number] and read or in his room. Pt is bright on approach. Stated \"I'm just feeling a bit down but I'm hopeful. \" We will continue to provide support and comfort for the patient.      Problem: Depressive Behavior With or Without Suicide Precautions:  Goal: Able to verbalize acceptance of life and situations over which he or she has no control  Description: Able to verbalize acceptance of life and situations over which he or she has no control  Outcome: Met This Shift     Problem: Depressive Behavior With or Without Suicide Precautions:  Goal: Ability to disclose and discuss suicidal ideas will improve  Description: Ability to disclose and discuss suicidal ideas will improve  12/13/2020 1440 by Song Foster RN  Outcome: Met This Shift     Problem: Depressive Behavior With or Without Suicide Precautions:  Goal: Able to verbalize support systems  Description: Able to verbalize support systems  12/13/2020 1440 by Song Foster RN  Outcome: Met This Shift     Problem: Depressive Behavior With or Without Suicide Precautions:  Goal: Absence of self-harm  Description: Absence of self-harm  12/13/2020 1440 by Song Foster RN  Outcome: Met This Shift     Problem: Substance Abuse:  Goal: Absence of drug withdrawal signs and symptoms  Description: Absence of drug withdrawal signs and symptoms  Outcome: Met This Shift

## 2020-12-13 NOTE — PROGRESS NOTES
[facility]  Admission Note     Patient denies suicidal ideations, homicidal ideations and hallucinations. Reports reason for admission was \"I overdosed\". Patient admits to intentionally overdosing on Ambien and his blood pressure medications. Patient does appear to be minimizing the situation. Admits to a previous suicide attempt by overdose, which he reports was approximately 20 years ago. Reports he has had increased depression and anxiety due to a strained relationship with his son as well as financial issues. Patient reports that he does not treat with a psychiatrist. Kiloza Grad to occasional cocaine abuse but reports he wants to stop. Also admits to a history of drug and alcohol abuse. Patient is pleasant and cooperative during admission interview. No unit issues reported. Will continue to observe and support. Admission Type:   Admission Type:  Involuntary    Reason for admission:  Reason for Admission: \"I overdosed\"    PATIENT STRENGTHS:  Strengths: Communication    Patient Strengths and Limitations:  Limitations: Inappropriate/potentially harmful leisure interests    Addictive Behavior:   Addictive Behavior  In the past 3 months, have you felt or has someone told you that you have a problem with:  : None  Do you have a history of Chemical Use?: No  Do you have a history of Alcohol Use?: No  Do you have a history of Street Drug Abuse?: Yes  Histroy of Prescripton Drug Abuse?: No    Medical Problems:   Past Medical History:   Diagnosis Date    Aortic stenosis     CAD (coronary artery disease)     [facility]erebral artery occlusion with [facility] infarction ([facility])     Hyperlipidemia     Hypertension     Suicide attempt ([facility])     Unspecified cerebral artery occlusion with cerebral infarction        Status EXAM:  Status and Exam  Normal: No  Facial Expression: Flat  Affect: Congruent  Level of Consciousness: Alert  Mood:Normal: No  Mood: Depressed, Anxious  Motor Activity:Normal: Yes Interview Behavior: Cooperative  Preception: [place] to Person, Eusebio Dear to Time, [place] to Place, [place] to Situation  Attention:Normal: Yes  Thought Processes: Circumstantial  Thought Content:Normal: Yes  Hallucinations: None  Delusions: No  Memory:Normal: Yes  Insight and Judgment: No  Insight and Judgment: Poor Judgment, Poor Insight  Present Suicidal Ideation: No  Present Homicidal Ideation: No    Tobacco Screening:  Practical Counseling, on admission, kamille X, if applicable and completed (first 3 are required if patient doesn't refuse): ( x)  Recognizing danger situations (included triggers and roadblocks)                    ( x)  Coping skills (new ways to manage stress, exercise, relaxation techniques, changing routine, distraction)                                                           ( x)  Basic information about quitting (benefits of quitting, techniques in how to quit, available resources  ( ) Referral for counseling faxed to [facility]                                           ( ) Patient refused counseling  ( ) Patient has not smoked in the last 30 days    Metabolic Screening:    No results found for: LABA1C    No results for input(s): CHOL, TRIG, HDL, LDLCALC, LABVLDL in the last 72 hours. Body mass index is 24.33 kg/m².     BP Readings from Last 2 Encounters:   12/12/20 (!) 151/84   12/12/20 (!) 140/84           Pt admitted with followings belongings:  Dentures: Uppers  Vision - Corrective Lenses: None  Hearing Aid: None  Jewelry: Watch  Body Piercings Removed: N/A  Clothing: Pants, Socks, Undergarments (Comment), Jacket / coat, Shirt  Were All Patient Medications Collected?: Not Applicable  Other Valuables: None

## 2020-12-14 PROBLEM — R78.81 BACTEREMIA: Status: ACTIVE | Noted: 2020-12-14

## 2020-12-14 PROBLEM — N39.0 UTI (URINARY TRACT INFECTION): Status: ACTIVE | Noted: 2020-12-14

## 2020-12-14 LAB
BASOPHILS ABSOLUTE: 0.07 E9/L (ref 0–0.2)
BASOPHILS RELATIVE PERCENT: 1.1 % (ref 0–2)
EKG ATRIAL RATE: 70 BPM
EKG ATRIAL RATE: 82 BPM
EKG P AXIS: 17 DEGREES
EKG P AXIS: 26 DEGREES
EKG P-R INTERVAL: 146 MS
EKG P-R INTERVAL: 150 MS
EKG Q-T INTERVAL: 426 MS
EKG Q-T INTERVAL: 480 MS
EKG QRS DURATION: 156 MS
EKG QRS DURATION: 160 MS
EKG QTC CALCULATION (BAZETT): 497 MS
EKG QTC CALCULATION (BAZETT): 518 MS
EKG R AXIS: -10 DEGREES
EKG R AXIS: -7 DEGREES
EKG T AXIS: 139 DEGREES
EKG T AXIS: 94 DEGREES
EKG VENTRICULAR RATE: 70 BPM
EKG VENTRICULAR RATE: 82 BPM
EOSINOPHILS ABSOLUTE: 0.24 E9/L (ref 0.05–0.5)
EOSINOPHILS RELATIVE PERCENT: 3.9 % (ref 0–6)
HCT VFR BLD CALC: 45.8 % (ref 37–54)
HEMOGLOBIN: 15 G/DL (ref 12.5–16.5)
IMMATURE GRANULOCYTES #: 0.08 E9/L
IMMATURE GRANULOCYTES %: 1.3 % (ref 0–5)
LYMPHOCYTES ABSOLUTE: 1.77 E9/L (ref 1.5–4)
LYMPHOCYTES RELATIVE PERCENT: 28.4 % (ref 20–42)
MCH RBC QN AUTO: 30 PG (ref 26–35)
MCHC RBC AUTO-ENTMCNC: 32.8 % (ref 32–34.5)
MCV RBC AUTO: 91.6 FL (ref 80–99.9)
MONOCYTES ABSOLUTE: 0.69 E9/L (ref 0.1–0.95)
MONOCYTES RELATIVE PERCENT: 11.1 % (ref 2–12)
NEUTROPHILS ABSOLUTE: 3.38 E9/L (ref 1.8–7.3)
NEUTROPHILS RELATIVE PERCENT: 54.2 % (ref 43–80)
PDW BLD-RTO: 12.9 FL (ref 11.5–15)
PLATELET # BLD: 256 E9/L (ref 130–450)
PMV BLD AUTO: 10 FL (ref 7–12)
RBC # BLD: 5 E12/L (ref 3.8–5.8)
WBC # BLD: 6.2 E9/L (ref 4.5–11.5)

## 2020-12-14 PROCEDURE — 6370000000 HC RX 637 (ALT 250 FOR IP): Performed by: PSYCHIATRY & NEUROLOGY

## 2020-12-14 PROCEDURE — 87040 BLOOD CULTURE FOR BACTERIA: CPT

## 2020-12-14 PROCEDURE — 99232 SBSQ HOSP IP/OBS MODERATE 35: CPT | Performed by: NURSE PRACTITIONER

## 2020-12-14 PROCEDURE — 93010 ELECTROCARDIOGRAM REPORT: CPT | Performed by: INTERNAL MEDICINE

## 2020-12-14 PROCEDURE — 85025 COMPLETE CBC W/AUTO DIFF WBC: CPT

## 2020-12-14 PROCEDURE — 6370000000 HC RX 637 (ALT 250 FOR IP): Performed by: NURSE PRACTITIONER

## 2020-12-14 PROCEDURE — 1240000000 HC EMOTIONAL WELLNESS R&B

## 2020-12-14 PROCEDURE — 36415 COLL VENOUS BLD VENIPUNCTURE: CPT

## 2020-12-14 RX ADMIN — CARVEDILOL 12.5 MG: 6.25 TABLET, FILM COATED ORAL at 20:48

## 2020-12-14 RX ADMIN — ASPIRIN 325 MG: 325 TABLET, COATED ORAL at 09:23

## 2020-12-14 RX ADMIN — ATORVASTATIN CALCIUM 40 MG: 40 TABLET, FILM COATED ORAL at 20:48

## 2020-12-14 RX ADMIN — OXCARBAZEPINE 150 MG: 300 TABLET, FILM COATED ORAL at 09:23

## 2020-12-14 RX ADMIN — CARVEDILOL 12.5 MG: 6.25 TABLET, FILM COATED ORAL at 09:23

## 2020-12-14 RX ADMIN — CEFUROXIME AXETIL 250 MG: 250 TABLET ORAL at 20:48

## 2020-12-14 RX ADMIN — OXCARBAZEPINE 150 MG: 300 TABLET, FILM COATED ORAL at 20:48

## 2020-12-14 RX ADMIN — LISINOPRIL 5 MG: 10 TABLET ORAL at 09:23

## 2020-12-14 RX ADMIN — CEFUROXIME AXETIL 250 MG: 250 TABLET ORAL at 09:23

## 2020-12-14 ASSESSMENT — PAIN SCALES - GENERAL: PAINLEVEL_OUTOF10: 0

## 2020-12-14 NOTE — PLAN OF CARE
Problem: Depressive Behavior With or Without Suicide Precautions:  Goal: Able to verbalize acceptance of life and situations over which he or she has no control  Description: Able to verbalize acceptance of life and situations over which he or she has no control  12/13/2020 2327 by Marilee Arita RN  Outcome: Ongoing     Problem: Depressive Behavior With or Without Suicide Precautions:  Goal: Ability to disclose and discuss suicidal ideas will improve  Description: Ability to disclose and discuss suicidal ideas will improve  12/13/2020 2327 by Marilee Arita RN  Outcome: Ongoing     Problem: Depressive Behavior With or Without Suicide Precautions:  Goal: Able to verbalize support systems  Description: Able to verbalize support systems  12/13/2020 2327 by Marilee Arita RN  Outcome: Ongoing     Problem: Depressive Behavior With or Without Suicide Precautions:  Goal: Absence of self-harm  Description: Absence of self-harm  12/13/2020 2327 by Marilee Arita RN  Outcome: Ongoing

## 2020-12-14 NOTE — PROGRESS NOTES
[facility]  Initial Interdisciplinary Treatment Plan NOTE    Review Date & Time: 12/14/2020    Patient was in treatment team    Admission Type:   Admission Type:  Involuntary    Reason for admission:  Reason for Admission: \"I overdosed\"      Estimated Length of Stay Update:  3-5 days  Estimated Discharge Date Update: 1-3 days    PATIENT STRENGTHS:  Patient Strengths Strengths: Medication Compliance, Positive Support  Patient Strengths and Limitations:Limitations: Multiple barriers to leisure interests, Tendency to isolate self, Difficulty problem solving/relies on others to help solve problems  Addictive Behavior:Addictive Behavior  In the past 3 months, have you felt or has someone told you that you have a problem with:  : None  Do you have a history of Chemical Use?: Yes  Do you have a history of Alcohol Use?: Yes  Do you have a history of Street Drug Abuse?: Yes  Histroy of Prescripton Drug Abuse?: Yes  Medical Problems:  Past Medical History:   Diagnosis Date    Aortic stenosis     CAD (coronary artery disease)     [facility] [address]     Hyperlipidemia     Hypertension     [facility]     Unspecified cerebral artery occlusion with cerebral infarction        EDUCATION:   Learner Progress Toward Treatment Goals: Reviewed results and recommendations of this team, Reviewed group plan and strategies, Reviewed signs, symptoms and risk of self harm and violent behavior and Reviewed goals and plan of care    Method: Small group    Outcome: Verbalized understanding and Needs reinforcement    PATIENT GOALS:  Have more self control    PLAN/TREATMENT RECOMMENDATIONS UPDATE: continue to support patient in setting and obtaining both short term and long term goals while on the unit    GOALS UPDATE:   Time frame for Short-Term Goals:  1-3 days    Jenn Watkins RN

## 2020-12-14 NOTE — PLAN OF CARE
Alert and oriented x4 patient denies SI/HI is not having any auditory or visual hallucinations. Patient is experiencing depression that he rates 8 out of 10. Dose not state a known reason for depression,  will continue to monitor patient. He is isolative to his room with a flat affect and poor eye contact.   He is somewhat evasive with communications       Problem: Depressive Behavior With or Without Suicide Precautions:  Goal: Able to verbalize acceptance of life and situations over which he or she has no control  Description: Able to verbalize acceptance of life and situations over which he or she has no control  Outcome: Ongoing     Problem: Depressive Behavior With or Without Suicide Precautions:  Goal: Able to verbalize and/or display a decrease in depressive symptoms  Description: Able to verbalize and/or display a decrease in depressive symptoms  Outcome: Ongoing     Problem: Depressive Behavior With or Without Suicide Precautions:  Goal: Able to verbalize support systems  Description: Able to verbalize support systems  Outcome: Ongoing     Problem: Suicide risk  Goal: Provide patient with safe environment  Description: Provide patient with safe environment  Outcome: Ongoing

## 2020-12-14 NOTE — CONSULTS
Hospital Medicine  Consult History & Physical        Reason for consult: Positive blood cultures    Date of Service: Pt seen/examined in consultation on 12/14/2020    History Of Present Illness:    Mr. Jamal Bryant, a 61y.o. year old male  who  has a past medical history of Aortic stenosis, CAD (coronary artery disease), Cerebral artery occlusion with cerebral infarction ([facility] [address] Hyperlipidemia, Hypertension, Suicide attempt ([facility] [address] and Unspecified cerebral artery occlusion with cerebral infarction. Patient presented to [facility] ED with suicide attempt. He was apparently found by his friends unresponsive, EMS was called and found his blood pressure on arrival to be 52/x. He was fluid protested sedated in ED and eventually admitted to intentionally overdosing on Flexeril and his cardiac meds. He also admits to using cocaine. He was pink slipped and admitted for medical management. He was found to have acute kidney injury with a creatinine 2.4 secondary to hypotension from overdose on antihypertensives which resolved with fluid. He had a prolonged QTc interval 520 which also resolved and his Coreg was able to be restarted. Patient was also found to have pansensitive E. coli in urine for which she was given 2 days of ceftriaxone and converted to Ceftin x5 days. Patient was deemed medically clear on 12/12/2020 and transferred to the main hospital for I services. We were asked to see/evaluate the patient for positive blood cultures that were collected 12/10/2020 and resulted today. 1/4 bottles positive for Gram positive rods Diphtheroid-like. Patient with no complaints. He states his urinary symptoms have resolved, though did admit to getting UTIs at least once a year with no history of urology evaluation or known prostate problems.      Past Medical History:        Diagnosis Date    Aortic stenosis     CAD (coronary artery disease)  Cerebral artery occlusion with cerebral infarction ([facility] [address]     Hyperlipidemia     Hypertension     Suicide attempt ([facility] [address]     Unspecified cerebral artery occlusion with cerebral infarction        Past Surgical History:        Procedure Laterality Date    AORTIC VALVE REPLACEMENT N/A 2018    [facility]    DIAGNOSTIC CARDIAC CATH LAB PROCEDURE  06/21/2019    mRCA 40%, ostial LM 30-40%, mLAD 40%, ostium of LCx 60%    ECHO COMPL W DOP COLOR FLOW  8/24/2013         EYE SURGERY      cyst removal above left eye    KNEE SURGERY      right       Medications Prior to Admission:    Prior to Admission medications    Medication Sig Start Date End Date Taking? Authorizing Provider   lisinopril (PRINIVIL;ZESTRIL) 5 MG tablet Take 1 tablet by mouth daily 12/12/20  Yes Madison Webb DO   cefUROXime (CEFTIN) 250 MG tablet Take 1 tablet by mouth every 12 hours for 5 days 12/12/20 12/17/20 Yes Madison Webb DO   polyethylene glycol (GLYCOLAX) 17 g packet Take 17 g by mouth daily as needed for Constipation 12/12/20 1/11/21 Yes Madison Webb DO   albuterol sulfate HFA (VENTOLIN HFA) 108 (90 Base) MCG/ACT inhaler Inhale 2 puffs into the lungs every 6 hours as needed for Wheezing or Shortness of Breath   Yes Historical Provider, MD   vitamin D (ERGOCALCIFEROL) 1.25 MG (38148 UT) CAPS capsule Take 50,000 Units by mouth once a week Tuesday   Yes Historical Provider, MD   zolpidem (AMBIEN) 5 MG tablet Take 5 mg by mouth nightly as needed for Sleep. Yes Historical Provider, MD   atorvastatin (LIPITOR) 40 MG tablet Take 1 tablet by mouth nightly 6/22/19  Yes Karla Hunter MD   nitroGLYCERIN (NITROSTAT) 0.4 MG SL tablet up to max of 3 total doses.  If no relief after 1 dose, call 911. 6/22/19  Yes Karla Hunter MD   carvedilol (COREG) 12.5 MG tablet Take 12.5 mg by mouth 2 times daily    Yes Historical Provider, MD   aspirin 325 MG EC tablet Take 1 tablet by mouth daily 12/12/20   Etelvina Webb,  Allergies:  Patient has no known allergies. Social History:      TOBACCO:   reports that he has been smoking. He has a 20.00 pack-year smoking history. He has never used smokeless tobacco.  ETOH:   reports previous alcohol use. Family History:     Reviewed in detail and negative for DM, CAD, Cancer, CVA. REVIEW OF SYSTEMS:   Pertinent positives as noted in the HPI. All other systems reviewed and negative. PHYSICAL EXAM:  /61   Pulse 82   Temp 97.8 °F (36.6 °C) (Temporal)   Resp 17   Ht 5' 8\" (1.727 m)   Wt 160 lb (72.6 kg)   SpO2 97%   BMI 24.33 kg/m²   General appearance: No apparent distress, appears stated age and cooperative. HEENT: Normal cephalic, atraumatic without obvious deformity. Pupils equal, round, and reactive to light. Extra ocular muscles intact. Conjunctivae/corneas clear. Neck: Supple, with full range of motion. No jugular venous distention. Trachea midline. Respiratory:  Clear to auscultation bilaterally. No apparent distress. Cardiovascular:  Regular rate and rhythm. S1, S2 without murmurs, rubs, or gallops. PV: Brisk capillary refill. +2 pedal and radial pulses bilaterally. No clubbing, cyanosis, edema of bilateral lower extremities. Abdomen: Soft, non-tender, non-distended. +BS  Musculoskeletal: No obvious deformities or erythematous or edematous joints. Skin: Normal skin color. No rashes or lesions. Neurologic:  Neurovascularly intact without any focal sensory/motor deficits.  Cranial nerves: II-XII intact, grossly non-focal.  Psychiatric: Alert and oriented, thought content appropriate, normal insight    Labs:     CBC:   Recent Labs     12/11/20  1527   WBC 6.0   RBC 4.39   HGB 13.3   HCT 39.9   MCV 90.9   RDW 13.1        BMP:   Recent Labs     12/11/20  1527      K 4.8      CO2 23   BUN 16   CREATININE 1.0     LFT:  Recent Labs     12/11/20  1527   PROT 6.5   ALKPHOS 71   ALT 9   AST 14   BILITOT 0.2     D Dimer:   Lab Results Component Value Date    DDIMER 2395 12/09/2020     Lactic Acid:   Lab Results   Component Value Date    LACTA 0.8 12/10/2020       ASSESSMENT:  Active Problems:    CAD (coronary artery disease)    Essential hypertension    Depression with suicidal ideation    Bipolar 2 disorder, major depressive episode (HCC)    Cocaine abuse ([address]    UTI (urinary tract infection)  Resolved Problems:    * No resolved hospital problems. *       PLAN:    Received 2G of rocephin and 4 days of Ceftin thus far. Will need 2 more days of Ceftin for Ecoli UTI  Bacteremia with 1/4 bottles positive for Gram positive rods Diphtheroid-like, likely a contaminate, but will repeat  Afebrile, VS WNL, will check CBC for leukocytosis  Check PSA  Would likely benefit from OP urology evaluation. Thanks for allowing us to participate in the care of Rachel Noyola. We will continue to follow along. Please do not hesitate to contact us if needed.    +++++++++++++++++++++++++++++++++++++++++++++++++  DWAINE Robertson/ Shane Reich 19, [facility]  +++++++++++++++++++++++++++++++++++++++++++++++++  NOTE: This report was transcribed using voice recognition software. Every effort was made to ensure accuracy; however, inadvertent computerized transcription errors may be present.

## 2020-12-15 LAB
BLOOD CULTURE, ROUTINE: NORMAL
PROSTATE SPECIFIC ANTIGEN: 1.3 NG/ML (ref 0–4)

## 2020-12-15 PROCEDURE — 6370000000 HC RX 637 (ALT 250 FOR IP): Performed by: PSYCHIATRY & NEUROLOGY

## 2020-12-15 PROCEDURE — 6370000000 HC RX 637 (ALT 250 FOR IP): Performed by: NURSE PRACTITIONER

## 2020-12-15 PROCEDURE — 1240000000 HC EMOTIONAL WELLNESS R&B

## 2020-12-15 PROCEDURE — 36415 COLL VENOUS BLD VENIPUNCTURE: CPT

## 2020-12-15 PROCEDURE — 99232 SBSQ HOSP IP/OBS MODERATE 35: CPT | Performed by: NURSE PRACTITIONER

## 2020-12-15 PROCEDURE — 84153 ASSAY OF PSA TOTAL: CPT

## 2020-12-15 RX ORDER — OXCARBAZEPINE 300 MG/1
300 TABLET, FILM COATED ORAL 2 TIMES DAILY
Status: DISCONTINUED | OUTPATIENT
Start: 2020-12-15 | End: 2020-12-17 | Stop reason: HOSPADM

## 2020-12-15 RX ORDER — ARIPIPRAZOLE 5 MG/1
5 TABLET ORAL DAILY
Status: DISCONTINUED | OUTPATIENT
Start: 2020-12-15 | End: 2020-12-17 | Stop reason: HOSPADM

## 2020-12-15 RX ORDER — LANOLIN ALCOHOL/MO/W.PET/CERES
6 CREAM (GRAM) TOPICAL NIGHTLY
Status: DISCONTINUED | OUTPATIENT
Start: 2020-12-15 | End: 2020-12-17 | Stop reason: HOSPADM

## 2020-12-15 RX ADMIN — CARVEDILOL 12.5 MG: 6.25 TABLET, FILM COATED ORAL at 08:54

## 2020-12-15 RX ADMIN — Medication 6 MG: at 20:42

## 2020-12-15 RX ADMIN — CEFUROXIME AXETIL 250 MG: 250 TABLET ORAL at 20:44

## 2020-12-15 RX ADMIN — ASPIRIN 325 MG: 325 TABLET, COATED ORAL at 08:54

## 2020-12-15 RX ADMIN — OXCARBAZEPINE 150 MG: 300 TABLET, FILM COATED ORAL at 08:53

## 2020-12-15 RX ADMIN — OXCARBAZEPINE 300 MG: 300 TABLET, FILM COATED ORAL at 20:43

## 2020-12-15 RX ADMIN — ATORVASTATIN CALCIUM 40 MG: 40 TABLET, FILM COATED ORAL at 20:42

## 2020-12-15 RX ADMIN — ARIPIPRAZOLE 5 MG: 5 TABLET ORAL at 17:26

## 2020-12-15 RX ADMIN — LISINOPRIL 5 MG: 10 TABLET ORAL at 08:53

## 2020-12-15 RX ADMIN — CEFUROXIME AXETIL 250 MG: 250 TABLET ORAL at 10:24

## 2020-12-15 RX ADMIN — ERGOCALCIFEROL 50000 UNITS: 1.25 CAPSULE ORAL at 20:45

## 2020-12-15 RX ADMIN — MAGNESIUM HYDROXIDE/ALUMINUM HYDROXICE/SIMETHICONE 30 ML: 120; 1200; 1200 SUSPENSION ORAL at 20:48

## 2020-12-15 RX ADMIN — CARVEDILOL 12.5 MG: 6.25 TABLET, FILM COATED ORAL at 20:42

## 2020-12-15 ASSESSMENT — PAIN SCALES - GENERAL
PAINLEVEL_OUTOF10: 0

## 2020-12-15 NOTE — PLAN OF CARE
Pt was pleasant and cooperative during assessment. Pt denies SI HI and AVH. Pt rates anxiety and depression 6/10. Pt states he feels a lot better today than he did yesterday. Pt reports not sleeping well last night and feels he would feel better if he could sleep. Pt is medication compliant. Pt attended morning goal group. Will continue to monitor and support patient as needed.       Problem: Depressive Behavior With or Without Suicide Precautions:  Goal: Ability to disclose and discuss suicidal ideas will improve  Description: Ability to disclose and discuss suicidal ideas will improve  Outcome: Met This Shift     Problem: Depressive Behavior With or Without Suicide Precautions:  Goal: Absence of self-harm  Description: Absence of self-harm  12/15/2020 1039 by Gabriela Dinero RN  Outcome: Met This Shift

## 2020-12-15 NOTE — PROGRESS NOTES
Hospitalist Progress Note      Chart reviewed. Awaiting PSA and blood culture results. We will continue to follow peripherally. Non-billable rounding. Thanks for allowing us to participate in the care of Carlatae Perez.   Please do not hesitate to contact us if needed.  +++++++++++++++++++++++++++++++++++++++++++++++++  Nataly Zapien, [address] [place] [address]

## 2020-12-15 NOTE — PLAN OF CARE
Pt resting in bed with eyes closed, no observed abnormalities. Close observations continue.    Problem: Depressive Behavior With or Without Suicide Precautions:  Goal: Absence of self-harm  Description: Absence of self-harm  Outcome: Met This Shift     Problem: Substance Abuse:  Goal: Absence of drug withdrawal signs and symptoms  Description: Absence of drug withdrawal signs and symptoms  Outcome: Met This Shift

## 2020-12-15 NOTE — PROGRESS NOTES
[address] [facility] FOLLOW-UP NOTE     12/15/2020     Patient was seen and examined in person, Chart reviewed   Patient's case discussed with staff/team    Chief Complaint: \"My anxiety is really high and I'm not sleeping. \"     Interim History:     Patient is up on the unit with peers. He is cooperative for assessment. He maintains intense eye contact. His speech is rapid and pressured. He feels \"depressed\", but states anxiety is his biggest problem today rating it 9/10 and sleeplessness. His mood is \"anxious. \" His affect is irritable, anxious, hypomanic. He denies SI/HI intent or plan, denies AVH. Appetite:  [x] Normal/Unchanged  [] Increased  [] Decreased      Sleep:       [x] Normal/Unchanged  [] Fair       [] Poor              Energy:    [x] Normal/Unchanged  [] Increased  [] Decreased        SI [] Present  [x] Absent    HI  []Present  [x] Absent     Aggression:  [] yes  [x] no    Patient is [x] able  [] unable to CONTRACT FOR SAFETY     PAST MEDICAL/PSYCHIATRIC HISTORY:   Past Medical History:   Diagnosis Date    Aortic stenosis     CAD (coronary artery disease)     [facility]bral [facility] occlusion [facility] infarction [facility] [address]     Hyperlipidemia     Hypertension     Su[facility] attempt ([facility] [address]     Unspecified cerebral artery occlusion with cerebral infarction        FAMILY/SOCIAL HISTORY:  History reviewed. No pertinent family history.   Social History     Socioeconomic History    Marital status:      Spouse name: Not on file    Number of children: 1    Years of education: 15    Highest education level: Not on file   Occupational History    Not on file   Social Needs    Financial resource strain: Not on file    Food insecurity     Worry: Not on file     Inability: Not on file   [employer] needs     Medical: Not on file     Non-medical: Not on file   Tobacco Use    Smoking status: Current Every Day Smoker     Packs/day: 0.50     Years: 40.00     Pack years: 20.00  Smokeless tobacco: Never Used   Substance and Sexual Activity    Alcohol use: Not Currently    Drug use: Yes     Types: Cocaine    Sexual activity: Yes     Partners: Female   Lifestyle    Physical activity     Days per week: Not on file     Minutes per session: Not on file    Stress: Not on file   Relationships    Social connections     Talks on phone: Not on file     Gets together: Not on file     Attends Yazidi service: Not on file     Active member of club or organization: Not on file     Attends meetings of clubs or organizations: Not on file     Relationship status: Not on file    Intimate partner violence     Fear of current or ex partner: Not on file     Emotionally abused: Not on file     Physically abused: Not on file     Forced sexual activity: Not on file   Other Topics Concern    Not on file   Social History Narrative    ** Merged History Encounter **          MEDICATIONS:    Current Facility-Administered Medications:     OXcarbazepine (TRILEPTAL) tablet 150 mg, 150 mg, Oral, BID, Gino Crawford MD, 150 mg at 12/15/20 0853    melatonin tablet 6 mg, 6 mg, Oral, Nightly PRN, Gino Crawford MD    albuterol (PROVENTIL) nebulizer solution 2.5 mg, 2.5 mg, Nebulization, Q6H PRN, Gino Crawford MD    aspirin EC tablet 325 mg, 325 mg, Oral, Daily, Gino Crawford MD, 325 mg at 12/15/20 0854    atorvastatin (LIPITOR) tablet 40 mg, 40 mg, Oral, Nightly, Gino Crawford MD, 40 mg at 12/14/20 2048    carvedilol (COREG) tablet 12.5 mg, 12.5 mg, Oral, BID, Gino Crawford MD, 12.5 mg at 12/15/20 0854    cefUROXime (CEFTIN) tablet 250 mg, 250 mg, Oral, 2 times per day, FANY Goss NP, 250 mg at 12/15/20 1024    lisinopril (PRINIVIL;ZESTRIL) tablet 5 mg, 5 mg, Oral, Daily, Gino Crawford MD, 5 mg at 12/15/20 0853    polyethylene glycol (GLYCOLAX) packet 17 g, 17 g, Oral, Daily PRN, Gino Crawford MD No results for input(s): NA, K, CL, CO2, BUN, CREATININE, GLUCOSE in the last 72 hours. No results for input(s): BILITOT, ALKPHOS, AST, ALT in the last 72 hours. Lab Results   Component Value Date    LABAMPH NOT DETECTED 12/09/2020    [address] NOT DETECTED 12/27/2010    BARBSCNU NOT DETECTED 12/09/2020    LABBENZ NOT DETECTED 12/09/2020    LABBENZ NOT DETECTED 12/27/2010    CANNAB NOT DETECTED  12/27/2010    LABMETH NOT DETECTED 12/09/2020    OPIATESCREENURINE NOT DETECTED 12/09/2020    PHENCYCLIDINESCREENURINE NOT DETECTED 12/09/2020    ETOH <10 12/09/2020     No results found for: TSH, FREET4  No results found for: LITHIUM  No results found for: VALPROATE, CBMZ        Treatment Plan:  The plan of care has been reviewed with Dr. Jenny Milner and the collaborative care team.    Increase Trileptal 300mg twice daily for mood   Add Abilify 5 mg daily for psychosis   Add Melatonin 6 mg nightly for sleep  Continue PRN medications as needed      Medications and plan of care has been reviewed with the patient. Risks, benefits, side effects, drug-to-drug interactions and alternatives to treatment were discussed. The patient was also educated that the outcome of treatment will depend on the medication compliance as directed by the prescribers along with regular follow-up, compliance with labs and other work-up, as clinically indicated. Patient was educated that if he chooses to continue to use illicit drugs or alcohol he may potentially act out impulsively, resulting in serious harm to self or others, even though unintentional.  He was also counseled that mental health treatment cannot be optimized with ongoing use of drugs or alcohol. Patient demonstrated understanding and has the capacity to understand these. Collateral information:   CD evaluation  Encourage patient to attend group and other milieu activities.   Discharge planning discussed with the patient and treatment team.    PSYCHOTHERAPY/COUNSELING: [x] Therapeutic interview  [x] Supportive  [] CBT  [] Ongoing  [] Other    [x] Patient continues to need, on a daily basis, active treatment furnished directly by or requiring the supervision of inpatient psychiatric personnel      Anticipated Length of stay: 5 - 7 days             Electronically signed by FANY Mccray CNP on 12/15/2020 at 2:58 PM

## 2020-12-15 NOTE — PROGRESS NOTES
Group Therapy Note  Patient attended goals group and stated daily goal as to manage my irritability. Group Therapy Note    Date: 12/15/2020  Start Time: 9:45  End Time: 10:15   Number of Participants: 9    Type of Group: Psychoeducation    Wellness Binder Information  Module Name:  Coping Skills  Session Number: NA    Patient's Goal:  To id positive coping skills to use on a daily basis. Notes: Attended group and was able to participate. Status After Intervention:  Improved    Participation Level:  Active Listener and Interactive    Participation Quality: Attentive and Sharing      Speech:  hesitant      Thought Process/Content: Linear      Affective Functioning: Flat      Mood: depressed and irritable      Level of consciousness:  Alert      Response to Learning: Progressing to goal      Endings: None Reported    Modes of Intervention: Education      Discipline Responsible: Psychoeducational Specialist      Signature:  JERICOH Hernandez

## 2020-12-16 LAB
CULTURE, BLOOD 2: ABNORMAL
EKG ATRIAL RATE: 76 BPM
EKG P AXIS: 11 DEGREES
EKG P-R INTERVAL: 154 MS
EKG Q-T INTERVAL: 456 MS
EKG QRS DURATION: 156 MS
EKG QTC CALCULATION (BAZETT): 513 MS
EKG R AXIS: -2 DEGREES
EKG T AXIS: 147 DEGREES
EKG VENTRICULAR RATE: 76 BPM
ORGANISM: ABNORMAL

## 2020-12-16 PROCEDURE — 6370000000 HC RX 637 (ALT 250 FOR IP): Performed by: NURSE PRACTITIONER

## 2020-12-16 PROCEDURE — 1240000000 HC EMOTIONAL WELLNESS R&B

## 2020-12-16 PROCEDURE — 93010 ELECTROCARDIOGRAM REPORT: CPT | Performed by: INTERNAL MEDICINE

## 2020-12-16 PROCEDURE — 6370000000 HC RX 637 (ALT 250 FOR IP): Performed by: PSYCHIATRY & NEUROLOGY

## 2020-12-16 PROCEDURE — 99232 SBSQ HOSP IP/OBS MODERATE 35: CPT | Performed by: NURSE PRACTITIONER

## 2020-12-16 PROCEDURE — 93005 ELECTROCARDIOGRAM TRACING: CPT | Performed by: NURSE PRACTITIONER

## 2020-12-16 RX ADMIN — ARIPIPRAZOLE 5 MG: 5 TABLET ORAL at 09:16

## 2020-12-16 RX ADMIN — CARVEDILOL 12.5 MG: 6.25 TABLET, FILM COATED ORAL at 20:33

## 2020-12-16 RX ADMIN — OXCARBAZEPINE 300 MG: 300 TABLET, FILM COATED ORAL at 20:33

## 2020-12-16 RX ADMIN — ATORVASTATIN CALCIUM 40 MG: 40 TABLET, FILM COATED ORAL at 20:33

## 2020-12-16 RX ADMIN — Medication 6 MG: at 20:33

## 2020-12-16 RX ADMIN — CARVEDILOL 12.5 MG: 6.25 TABLET, FILM COATED ORAL at 09:16

## 2020-12-16 RX ADMIN — OXCARBAZEPINE 300 MG: 300 TABLET, FILM COATED ORAL at 09:16

## 2020-12-16 RX ADMIN — ASPIRIN 325 MG: 325 TABLET, COATED ORAL at 09:16

## 2020-12-16 RX ADMIN — CEFUROXIME AXETIL 250 MG: 250 TABLET ORAL at 20:33

## 2020-12-16 RX ADMIN — LISINOPRIL 5 MG: 10 TABLET ORAL at 09:16

## 2020-12-16 RX ADMIN — CEFUROXIME AXETIL 250 MG: 250 TABLET ORAL at 09:16

## 2020-12-16 ASSESSMENT — PAIN SCALES - GENERAL
PAINLEVEL_OUTOF10: 0

## 2020-12-16 NOTE — PLAN OF CARE
Denies SI, HI, and hallucinations. Patient is irritable and has poor eye contact. He is dismissive this morning and states \"I'm fine. \" He has been out on the unit watching TV and ate breakfast. Medication compliant. No behavioral issues or PRNs administered. No complaints or concerns verbalized at this time. Will continue to offer support.       Problem: Depressive Behavior With or Without Suicide Precautions:  Goal: Absence of self-harm  Description: Absence of self-harm  12/16/2020 0947 by Marisa Mccauley RN  Outcome: Met This Shift     Problem: Substance Abuse:  Goal: Absence of drug withdrawal signs and symptoms  Description: Absence of drug withdrawal signs and symptoms  12/16/2020 0947 by Marisa Mccauley RN  Outcome: Met This Shift     Problem: Suicide risk  Goal: Provide patient with safe environment  Description: Provide patient with safe environment  Outcome: Met This Shift     Problem: Falls - Risk of:  Goal: Will remain free from falls  Description: Will remain free from falls  Outcome: Met This Shift         Electronically signed by Marisa Mccauley RN on 12/16/2020 at 9:54 AM

## 2020-12-16 NOTE — GROUP NOTE
Group Therapy Note    Date: 12/16/2020    Group Start Time: 1000  Group End Time: 9683  Group Topic: Psychoeducation    [facility]    Claudia Jacobs, CTRS        Group Therapy Note      Number of participants: 5  Type of group: Psychoeducation  Mode of intervention: Education, Support, Socialization, Exploration, Clarifying, Problem-solving, and Activity  Topic: Reminiscence Therapy: [employer]   Objective: Pt will initiate conversations with peers across group and learn 1 new thing about peers. Patient's Goal:  \"Go home\"    Notes:  Pt offered minimal interaction during group but was an active listener throughout. Ardelgado Hookss asleep part way through group AEB snoring in recliner chair. Status After Intervention:  Unchanged    Participation Level:  Active Listener and Minimal    Participation Quality: Resistant and Lethargic      Speech:  normal      Thought Process/Content: Perseverating      Affective Functioning: Congruent      Mood: lethargic      Level of consciousness:  Inattentive      Response to Learning: Progressing to goal      Endings: None Reported    Modes of Intervention: Education, Support, Socialization, Exploration, Clarifying, Problem-solving and Activity

## 2020-12-16 NOTE — GROUP NOTE
Group Therapy Note    Date: 12/15/2020    Group Start Time: 1930  Group End Time: 2030  Group Topic: Relaxation    [facility] [address]        Group Therapy Note    Attendees: 8/11         Signature:  Lissette Ashford

## 2020-12-16 NOTE — PROGRESS NOTES
BEHAVIORAL HEALTH FOLLOW-UP NOTE     12/16/2020     Patient was seen and examined in person, Chart reviewed   Patient's case discussed with staff/team    Chief Complaint: \"I am doing a lot better, just anxious trying to line up a ride for when I can go home. \"     Interim History:     Patient is up on the unit with peers. He is cooperative for assessment. He maintains intense eye contact. His speech is normal rate and rythym. He feels \"depressed\", but states it is improving. Endorsing irritability and anxiety is his biggest problem today. His mood is less anxious and irritable. He denies SI/HI intent or plan, denies AVH. Abilify 5 mg added to augment treatment with trileptal. Pt advised SW can help arrange transportation. Appetite:  [x] Normal/Unchanged  [] Increased  [] Decreased      Sleep:       [x] Normal/Unchanged  [] Fair       [] Poor              Energy:    [x] Normal/Unchanged  [] Increased  [] Decreased        SI [] Present  [x] Absent    HI  []Present  [x] Absent     Aggression:  [] yes  [x] no    Patient is [x] able  [] unable to CONTRACT FOR SAFETY     PAST MEDICAL/PSYCHIATRIC HISTORY:   Past Medical History:   Diagnosis Date    Aortic stenosis     CAD (coronary artery disease)     [facility]bral [facility] occlusion [facility]     Hyperlipidemia     Hypertension     Suicide attempt ([facility]     Unspecified cerebral artery occlusion with cerebral infarction        FAMILY/SOCIAL HISTORY:  History reviewed. No pertinent family history.   Social History     Socioeconomic History    Marital status:      Spouse name: Not on file    Number of children: 1    Years of education: 15    Highest education level: Not on file   Occupational History    Not on file   Social Needs    Financial resource strain: Not on file    Food insecurity     Worry: Not on file     Inability: Not on file   [employer] needs     Medical: Not on file     Non-medical: Not on file   Tobacco Use  Smoking status: Current Every Day Smoker     Packs/day: 0.50     Years: 40.00     Pack years: 20.00    Smokeless tobacco: Never Used   Substance and Sexual Activity    Alcohol use: Not Currently    Drug use: Yes     Types: Cocaine    Sexual activity: Yes     Partners: Female   Lifestyle    Physical activity     Days per week: Not on file     Minutes per session: Not on file    Stress: Not on file   Relationships    Social connections     Talks on phone: Not on file     Gets together: Not on file     Attends Restorationist service: Not on file     Active member of club or organization: Not on file     Attends meetings of clubs or organizations: Not on file     Relationship status: Not on file    Intimate partner violence     Fear of current or ex partner: Not on file     Emotionally abused: Not on file     Physically abused: Not on file     Forced sexual activity: Not on file   Other Topics Concern    Not on file   Social History Narrative    ** Merged History Encounter **          MEDICATIONS:    Current Facility-Administered Medications:     melatonin tablet 6 mg, 6 mg, Oral, Nightly, Edgar Orlando APRN - CNP, 6 mg at 12/15/20 2042    OXcarbazepine (TRILEPTAL) tablet 300 mg, 300 mg, Oral, BID, FANY Vaca - CNP, 300 mg at 12/16/20 0916    ARIPiprazole (ABILIFY) tablet 5 mg, 5 mg, Oral, Daily, FANY Vaca - CNP, 5 mg at 12/16/20 0916    albuterol (PROVENTIL) nebulizer solution 2.5 mg, 2.5 mg, Nebulization, Q6H PRN, Nicolasa Balderas MD    aspirin EC tablet 325 mg, 325 mg, Oral, Daily, Nicolasa Balderas MD, 325 mg at 12/16/20 0916    atorvastatin (LIPITOR) tablet 40 mg, 40 mg, Oral, Nightly, Nicolasa Balderas MD, 40 mg at 12/15/20 2042    carvedilol (COREG) tablet 12.5 mg, 12.5 mg, Oral, BID, Nicolasa Balderas MD, 12.5 mg at 12/16/20 6092    cefUROXime (CEFTIN) tablet 250 mg, 250 mg, Oral, 2 times per day, FANY Acharya - NP, 250 mg at 12/16/20 3119   lisinopril (PRINIVIL;ZESTRIL) tablet 5 mg, 5 mg, Oral, Daily, Zoe Kocher, MD, 5 mg at 12/16/20 4148    polyethylene glycol (GLYCOLAX) packet 17 g, 17 g, Oral, Daily PRN, Zoe Kocher, MD    vitamin D (ERGOCALCIFEROL) capsule 50,000 Units, 50,000 Units, Oral, Weekly, Zoe Kocher, MD, 50,000 Units at 12/15/20 2045    acetaminophen (TYLENOL) tablet 650 mg, 650 mg, Oral, Q4H PRN, Zoe Kocher, MD    magnesium hydroxide (MILK OF MAGNESIA) 400 MG/5ML suspension 30 mL, 30 mL, Oral, Daily PRN, Zoe Kocher, MD    aluminum & magnesium hydroxide-simethicone (MAALOX) 200-200-20 MG/5ML suspension 30 mL, 30 mL, Oral, PRN, Zoe Kocher, MD, 30 mL at 12/15/20 2048    haloperidol lactate (HALDOL) injection 3 mg, 3 mg, Intramuscular, Q6H PRN **OR** haloperidol (HALDOL) tablet 3 mg, 3 mg, Oral, Q6H PRN, Zoe Kocher, MD      Examination:  /74   Pulse 79   Temp 98 °F (36.7 °C) (Oral)   Resp 16   Ht 5' 8\" (1.727 m)   Wt 160 lb (72.6 kg)   SpO2 96%   BMI 24.33 kg/m²   Gait - steady  Medication side effects(SE): none    Mental Status Examination:    Level of consciousness:  within normal limits   Appearance:  good grooming and good hygiene  Behavior/Motor:  psychomotor agitation  Attitude toward examiner:  cooperative and attentive  Speech:  normal rate, normal volume and well articulated   Mood: anxious  Affect:  anxious and intense  Thought processes:  linear   Thought content: Devoid SI/HI intent or plan, devoid of auditory or visual hallucinations.    Cognition:  oriented to person, place, and time   Concentration distractible  Insight poor   Judgement poor     ASSESSMENT:   Patient symptoms are:  [x] Well controlled  [] Improving  [] Worsening  [] No change      Diagnosis:    Active Problems:    CAD (coronary artery disease)    Essential hypertension    Depression with suicidal ideation    Bipolar 2 disorder, major depressive episode ([facility]    Cocaine abuse ([facility] UTI (urinary tract infection)    Bacteremia  Resolved Problems:    * No resolved hospital problems. *      LABS:    Recent Labs     12/14/20  1647   WBC 6.2   HGB 15.0        No results for input(s): NA, K, CL, CO2, BUN, CREATININE, GLUCOSE in the last 72 hours. No results for input(s): BILITOT, ALKPHOS, AST, ALT in the last 72 hours. Lab Results   Component Value Date    LABAMPH NOT DETECTED 12/09/2020    [address] NOT DETECTED 12/27/2010    BARBSCNU NOT DETECTED 12/09/2020    LABBENZ NOT DETECTED 12/09/2020    LABBENZ NOT DETECTED 12/27/2010    CANNAB NOT DETECTED  12/27/2010    LABMETH NOT DETECTED 12/09/2020    OPIATESCREENURINE NOT DETECTED 12/09/2020    PHENCYCLIDINESCREENURINE NOT DETECTED 12/09/2020    ETOH <10 12/09/2020     No results found for: TSH, FREET4  No results found for: LITHIUM  No results found for: VALPROATE, CBMZ        Treatment Plan:  The plan of care has been reviewed with Dr. Xin Weaver and the collaborative care team.    Increase Trileptal 300mg twice daily for mood   Add Abilify 5 mg daily for psychosis   Add Melatonin 6 mg nightly for sleep  Continue PRN medications as needed      Medications and plan of care has been reviewed with the patient. Risks, benefits, side effects, drug-to-drug interactions and alternatives to treatment were discussed. The patient was also educated that the outcome of treatment will depend on the medication compliance as directed by the prescribers along with regular follow-up, compliance with labs and other work-up, as clinically indicated. Patient was educated that if he chooses to continue to use illicit drugs or alcohol he may potentially act out impulsively, resulting in serious harm to self or others, even though unintentional.  He was also counseled that mental health treatment cannot be optimized with ongoing use of drugs or alcohol. Patient demonstrated understanding and has the capacity to understand these.   Collateral information: CD evaluation  Encourage patient to attend group and other milieu activities.   Discharge planning discussed with the patient and treatment team.    PSYCHOTHERAPY/COUNSELING:  [x] Therapeutic interview  [x] Supportive  [] CBT  [] Ongoing  [] Other    [x] Patient continues to need, on a daily basis, active treatment furnished directly by or requiring the supervision of inpatient psychiatric personnel      Anticipated Length of stay: 5 - 7 days             Electronically signed by FANY Mark CNP on 12/16/2020 at 4:15 PM

## 2020-12-16 NOTE — PLAN OF CARE
Problem: Depressive Behavior With or Without Suicide Precautions:  Goal: Absence of self-harm  Description: Absence of self-harm  12/16/2020 0611 by Rajendra Garza  Outcome: Met This Shift     Problem: Substance Abuse:  Goal: Absence of drug withdrawal signs and symptoms  Description: Absence of drug withdrawal signs and symptoms  Outcome: Met This Shift

## 2020-12-17 VITALS
HEIGHT: 68 IN | TEMPERATURE: 98.2 F | BODY MASS INDEX: 24.25 KG/M2 | DIASTOLIC BLOOD PRESSURE: 81 MMHG | HEART RATE: 80 BPM | OXYGEN SATURATION: 99 % | WEIGHT: 160 LBS | RESPIRATION RATE: 16 BRPM | SYSTOLIC BLOOD PRESSURE: 139 MMHG

## 2020-12-17 PROCEDURE — 6370000000 HC RX 637 (ALT 250 FOR IP): Performed by: PSYCHIATRY & NEUROLOGY

## 2020-12-17 PROCEDURE — 6370000000 HC RX 637 (ALT 250 FOR IP): Performed by: NURSE PRACTITIONER

## 2020-12-17 PROCEDURE — 99239 HOSP IP/OBS DSCHRG MGMT >30: CPT | Performed by: NURSE PRACTITIONER

## 2020-12-17 RX ORDER — OXCARBAZEPINE 300 MG/1
300 TABLET, FILM COATED ORAL 2 TIMES DAILY
Qty: 60 TABLET | Refills: 0 | Status: SHIPPED | OUTPATIENT
Start: 2020-12-17 | End: 2021-04-26

## 2020-12-17 RX ORDER — ARIPIPRAZOLE 5 MG/1
5 TABLET ORAL DAILY
Qty: 30 TABLET | Refills: 0 | Status: SHIPPED | OUTPATIENT
Start: 2020-12-17 | End: 2021-04-26

## 2020-12-17 RX ORDER — LANOLIN ALCOHOL/MO/W.PET/CERES
6 CREAM (GRAM) TOPICAL NIGHTLY
Qty: 60 TABLET | Refills: 0 | Status: SHIPPED | OUTPATIENT
Start: 2020-12-17 | End: 2021-04-26

## 2020-12-17 RX ADMIN — OXCARBAZEPINE 300 MG: 300 TABLET, FILM COATED ORAL at 08:20

## 2020-12-17 RX ADMIN — ARIPIPRAZOLE 5 MG: 5 TABLET ORAL at 08:19

## 2020-12-17 RX ADMIN — ASPIRIN 325 MG: 325 TABLET, COATED ORAL at 08:20

## 2020-12-17 RX ADMIN — CARVEDILOL 12.5 MG: 6.25 TABLET, FILM COATED ORAL at 08:20

## 2020-12-17 RX ADMIN — LISINOPRIL 5 MG: 10 TABLET ORAL at 08:20

## 2020-12-17 RX ADMIN — MAGNESIUM HYDROXIDE/ALUMINUM HYDROXICE/SIMETHICONE 30 ML: 120; 1200; 1200 SUSPENSION ORAL at 01:21

## 2020-12-17 ASSESSMENT — PAIN SCALES - GENERAL
PAINLEVEL_OUTOF10: 0
PAINLEVEL_OUTOF10: 0

## 2020-12-17 NOTE — PROGRESS NOTES
CLINICAL PHARMACY NOTE: MEDS TO [address] Select Patient?: No  Total # of Prescriptions Filled: 2   The following medications were delivered to the patient:  · Oxcarbazepine 300 mg  · Aripiprazole 5 mg  Total # of Interventions Completed: 2  Time Spent (min): 30    Additional Documentation:

## 2020-12-17 NOTE — PROGRESS NOTES
[facility][1]  Discharge Note    Pt discharged with followings belongings:   Dentures: Uppers  Vision - Corrective Lenses: None  Hearing Aid: None  Jewelry: Watch  Body Piercings Removed: N/A  Clothing: Pants, Socks, Undergarments (Comment), Jacket / coat, Shirt  Were All Patient Medications Collected?: Not Applicable  Other Valuables: None   Valuables sent home with patient. Belongings retrieved from locker and returned to patient. Patient education on aftercare instructions: Yes  Information reviewed with patient by Pacheco Moss RN Patient verbalize understanding of AVS:  Yes.     Status EXAM upon discharge:  Status and Exam  Normal: Yes  Facial Expression: Brightened  Affect: Congruent  Level of Consciousness: Alert  Mood:Normal: No  Mood: Anxious  Motor Activity:Normal: Yes  Motor Activity: Decreased  Interview Behavior: Cooperative  Preception: [place] to Person, [place] to Time, [place] to Place, [place] to Situation  Attention:Normal: Yes  Attention: Distractible  Thought Processes: Circumstantial  Thought Content:Normal: No  Thought Content: Preoccupations  Hallucinations: None  Delusions: No  Memory:Normal: Yes  Memory: Poor Recent  Insight and Judgment: No  Insight and Judgment: Poor Judgment  Present Suicidal Ideation: No  Present Homicidal Ideation: No      Metabolic Screening:    No results found for: LABA1C    No results found for: CHOL  No results found for: TRIG  No results found for: HDL  No components found for: LDLCAL  No results found for: Barbara Rojas RN

## 2020-12-17 NOTE — SUICIDE SAFETY PLAN
SAFETY PLAN    A suicide Safety Plan is a document that supports someone when they are having thoughts of suicide. Warning Signs that indicate a suicidal crisis may be developing: What (situations, thoughts, feelings, body sensations, behaviors, etc.) do you experience that lets you know you are beginning to think about suicide? 1. Careful with self pity  2. Try to keep positive thoughts  3. Try to maintain positive projects    Internal Coping Strategies:  What things can I do (relaxation techniques, hobbies, physical activities, etc.) to take my mind off my problems without contacting another person? 1. Music  2. Reading  3. Drawing    People and social settings that provide distraction: Who can I call or where can I go to distract me? 1. Name: Shirlene Murray Phone: [phone]  2. Name: Jessicagregorio Norberto  Phone: [phone]   3. Place: My Garage            4. Place:     People whom I can ask for help: Who can I call when I need help - for example, friends, family, clergy, someone else? 1. Name: Giselle Collins                Phone: [phone]  2. Name: Adina Reyes  Phone: [phone]  3. Name: Shirlene Murray Phone: [phone]    Professionals or [address] [place], [place] [address]vd I can contact during a crisis: Who can I call for help - for example, my doctor, my psychiatrist, my psychologist, a mental health provider, a suicide hotline? 1. Clinician Name: Kierra Saavedra   Phone: [phone]      Clinician Pager or Emergency Contact #:     2. Clinician Name:    Phone:       Clinician Pager or Emergency Contact #:     3. Suicide Prevention Lifeline: 8-857-868-TALK (1127)    4. [address] [place] [address] Emergency Services -  for example, [facility] suicide hotline, [facility] Hotline:       Emergency Services Address:       Emergency Services Phone:     Making the environment safe: How can I make my environment (house/apartment/living space) safer? For example, can I remove guns, medications, and other items?   1. Good thoughts

## 2020-12-17 NOTE — PROGRESS NOTES
Follow up for results  psa 1.3  Not elevated  Blood cult not finalized  However so far no growth   We will continue to follow peripherally

## 2020-12-17 NOTE — CARE COORDINATION
arranged transportation through [employer]  1 [id number] , Trip number  [id number]  Arrival time between 12:00-3:00

## 2020-12-17 NOTE — GROUP NOTE
Group Therapy Note    Date: 12/17/2020    Group Start Time: 1010  Group End Time: 2298  Group Topic: Psychoeducation    [facility]        Group Therapy Note    Attendees: 6         Patient's Goal:  Not get caught up in my head. Notes:  Positive participation during discussion on self care. Able to identify skills to apply upon discharge today. Status After Intervention:  Improved    Participation Level:  Active Listener and Interactive    Participation Quality: Appropriate, Attentive and Sharing      Speech:  normal      Thought Process/Content: Logical      Affective Functioning: Congruent      Mood: euthymic      Level of consciousness:  Alert and Attentive      Response to Learning: Progressing to goal      Endings: None Reported    Modes of Intervention: Education, Support, Socialization and Exploration      Discipline Responsible: Psychoeducational Specialist      Signature:  Di Hameed, [address]

## 2020-12-17 NOTE — PLAN OF CARE
Patient is pleasant and cooperative. He is bright and social with peers. He denies suicidal ideations, homicidal ideations, and hallucinations. Patient states that he is expected to be discharged tomorrow. He reports that he is experiencing a manageable level of anxiety and verbalizes that he knows he has to \"stick with my follow up appointments\" after discharge. Patient and this nurse also discussed support systems. Patient is medication compliant and in control of his behavior. Will continue to monitor and support.     Problem: Depressive Behavior With or Without Suicide Precautions:  Goal: Able to verbalize acceptance of life and situations over which he or she has no control  Description: Able to verbalize acceptance of life and situations over which he or she has no control  12/16/2020 2151 by Keisha Dominguez RN  Outcome: Ongoing     Problem: Depressive Behavior With or Without Suicide Precautions:  Goal: Able to verbalize and/or display a decrease in depressive symptoms  Description: Able to verbalize and/or display a decrease in depressive symptoms  12/16/2020 2151 by Keisha Dominguez RN  Outcome: Ongoing     Problem: Depressive Behavior With or Without Suicide Precautions:  Goal: Ability to disclose and discuss suicidal ideas will improve  Description: Ability to disclose and discuss suicidal ideas will improve  Outcome: Ongoing     Problem: Depressive Behavior With or Without Suicide Precautions:  Goal: Able to verbalize support systems  Description: Able to verbalize support systems  Outcome: Ongoing     Problem: Suicide risk  Goal: Provide patient with safe environment  Description: Provide patient with safe environment  12/16/2020 2151 by Keisha Dominguez RN  Outcome: Ongoing

## 2020-12-17 NOTE — DISCHARGE SUMMARY
DISCHARGE SUMMARY      Patient ID:  Alma Zamora  [id number]  15 y.o.  1960    Admit date: 12/12/2020    Discharge date and time: 12/17/2020    Admitting Physician: Zamzam Quinones MD     Discharge Physician: Vaishnavi Pendleton. Lew Love MD  Admission Diagnoses: Depression with suicidal ideation [F32.9, R45.851]    Admission Condition: poor    Discharged Condition: stable    Admission Circumstance:   Alma Zamora  is a 61 y.o.   man living with the girlfriend for last 1 year, moved to [place] from [place] after open heart surgery, on Social Security disability, history of depression but untreated for many many years, had 1 inpatient psychiatric hospitalization in [place] in 1992 for suicidal attempt, [facility] after he overdosed on Ambien antihypertensive drugs and Flexeril. His urine tox was positive for cocaine. He has history of several overdose attempts in the past.  He is not seeing anybody in the community. He was seen by her psychiatrist at [facility] and recommended admission to inpatient care. Patient came to psychiatric waltesr last night after medical clearance. However his QTC was 517 and he has multiple other coronary artery disease status post open heart surgery. PAST MEDICAL/PSYCHIATRIC HISTORY:   Past Medical History:   Diagnosis Date    Aortic stenosis     CAD (coronary artery disease)     Cerebral artery occlusion with cerebral infarction (HCC)     Hyperlipidemia     Hypertension     Suicide attempt ([facility] [address]     Unspecified cerebral artery occlusion with cerebral infarction        FAMILY/SOCIAL HISTORY:  History reviewed. No pertinent family history.   Social History     Socioeconomic History    Marital status:      Spouse name: Not on file    Number of children: 1    Years of education: 12    Highest education level: Not on file   Occupational History    Not on file   Social Needs    Financial resource strain: Not on file  Food insecurity     Worry: Not on file     Inability: Not on file    Transportation needs     Medical: Not on file     Non-medical: Not on file   Tobacco Use    Smoking status: Current Every Day Smoker     Packs/day: 0.50     Years: 40.00     Pack years: 20.00    Smokeless tobacco: Never Used   Substance and Sexual Activity    Alcohol use: Not Currently    Drug use: Yes     Types: Cocaine    Sexual activity: Yes     Partners: Female   Lifestyle    Physical activity     Days per week: Not on file     Minutes per session: Not on file    Stress: Not on file   Relationships    Social connections     Talks on phone: Not on file     Gets together: Not on file     Attends Denominational service: Not on file     Active member of club or organization: Not on file     Attends meetings of clubs or organizations: Not on file     Relationship status: Not on file    Intimate partner violence     Fear of current or ex partner: Not on file     Emotionally abused: Not on file     Physically abused: Not on file     Forced sexual activity: Not on file   Other Topics Concern    Not on file   Social History Narrative    ** Merged History Encounter **            MEDICATIONS:    Current Facility-Administered Medications:     melatonin tablet 6 mg, 6 mg, Oral, Nightly, [place] Click, APRN - CNP, 6 mg at 12/16/20 2033    OXcarbazepine (TRILEPTAL) tablet 300 mg, 300 mg, Oral, BID, [place] Click, APRN - CNP, 300 mg at 12/16/20 2033    ARIPiprazole (ABILIFY) tablet 5 mg, 5 mg, Oral, Daily, Jasmina Click, APRN - CNP, 5 mg at 12/16/20 0916    albuterol (PROVENTIL) nebulizer solution 2.5 mg, 2.5 mg, Nebulization, Q6H PRN, Maye Jameson MD    aspirin EC tablet 325 mg, 325 mg, Oral, Daily, Maye Jameson MD, 325 mg at 12/16/20 0916    atorvastatin (LIPITOR) tablet 40 mg, 40 mg, Oral, Nightly, Maye Jameson MD, 40 mg at 12/16/20 2033 Patient was started on Abilify 5 mg for manic feature, Trileptal 300 mg twice daily to balance mood, melatonin 6 mg nightly for sleep. Other medically indicated medications were continued. The patient was encouraged to participate in group and other milieu activities. Patient started to feel better with this combination of treatment and experienced significant progress in the symptoms since admission. The patient was no longer suicidal.  He received the required treatment with medication, participated in group milieu, remained engaged in unit activities, and has learned appropriate coping skills. He was seen to be watching TV, socializing with peers, calling friends and family on the phone. There have been no mention or gestures of self-harm or harm to others. His mental status has returned to baseline. Team believes that the patient obtained maximum benefit of this hospitalization and does not meet the criteria for inpatient hospitalization anymore. However he will continue to benefit from outpatient follow-up and treatment to maintain stability. Collateral information has been obtained and reconciled and there are no concerns about his safety. He has no access to guns or weapons. The patient was discharged home. Appetite:  [x] Normal  [] Increased  [] Decreased    Sleep:       [x] Normal  [] Fair       [] Poor            Energy:    [x] Normal  [] Increased  [] Decreased     SI [] Present  [x] Absent  HI  []Present  [x] Absent   Aggression:  [] yes  [] no  Patient is [x] able  [] unable to CONTRACT FOR SAFETY   Medication side effects(SE):  [x] None(Psych.  Meds.) [] Other      Mental Status Examination on discharge:    Level of consciousness:  within normal limits   Appearance:  well-appearing  Behavior/Motor:  no abnormalities noted  Attitude toward examiner:  attentive and good eye contact  Speech:  spontaneous, normal rate and normal volume   Mood: euthymic  Affect:  mood congruent Thought processes:  linear   Thought content: Devoid of suicidal ideation intent or plan, homicidal ideation intent or plan, auditory or visual hallucinations or other perceptual disturbances. Cognition:  oriented to person, place, and time   Concentration intact  Memory intact  Insight good   Judgement fair   Fund of Knowledge adequate      ASSESSMENT:  Patient symptoms are:  [x] Well controlled  [x] Improving  [] Worsening  [] No change    Reason for more than one antipsychotic:  [x] N/A  [] 3 Failed Monotherapy attempts (Drugs tried:)  [] Crossover to a new antipsychotic  [] Taper to Monotherapy from Polypharmacy  [] Augmentation of clozapine therapy due to treatment resistance to single therapy    Diagnosis:  Active Problems:    CAD (coronary artery disease)    Essential hypertension    Depression with suicidal ideation    Bipolar 2 disorder, major depressive episode ([facility])    Cocaine abuse ([facility] [address]    UTI (urinary tract infection)    Bacteremia  Resolved Problems:    * No resolved hospital problems. *      LABS:    Recent Labs     12/14/20  1647   WBC 6.2   HGB 15.0        No results for input(s): NA, K, CL, CO2, BUN, CREATININE, GLUCOSE in the last 72 hours. No results for input(s): BILITOT, ALKPHOS, AST, ALT in the last 72 hours. Lab Results   Component Value Date    LABAMPH NOT DETECTED 12/09/2020    [address] NOT DETECTED 12/27/2010    BARBSCNU NOT DETECTED 12/09/2020    LABBENZ NOT DETECTED 12/09/2020    LABBENZ NOT DETECTED 12/27/2010    CANNAB NOT DETECTED  12/27/2010    LABMETH NOT DETECTED 12/09/2020    OPIATESCREENURINE NOT DETECTED 12/09/2020    PHENCYCLIDINESCREENURINE NOT DETECTED 12/09/2020    ETOH <10 12/09/2020     No results found for: TSH, FREET4  No results found for: LITHIUM  No results found for: VALPROATE, CBMZ    RISK ASSESSMENT AT DISCHARGE: Low risk for suicide and homicide.      Treatment Plan: Reviewed current Medications with the patient. Education provided on the complaince with treatment. The patient was also educated that the outcome of treatment will depend on the medication compliance as directed by the prescribers along with regular follow-up, compliance with labs and other work-up, as clinically indicated. Risks, benefits, side effects, drug-to-drug interactions and alternatives to treatment were discussed. Encourage patient to attend outpatient follow up appointment and therapy. Patient was advised to call the outpatient provider, visit the nearest ED or call 911 if symptoms are not manageable. Patient's family member was contacted prior to the discharge and the patient was discharged home. Medication List      START taking these medications    ARIPiprazole 5 MG tablet  Commonly known as: ABILIFY  Take 1 tablet by mouth daily     melatonin 3 MG Tabs tablet  Take 2 tablets by mouth nightly     OXcarbazepine 300 MG tablet  Commonly known as: TRILEPTAL  Take 1 tablet by mouth 2 times daily        CONTINUE taking these medications    aspirin 325 MG EC tablet  Take 1 tablet by mouth daily     atorvastatin 40 MG tablet  Commonly known as: LIPITOR  Take 1 tablet by mouth nightly     carvedilol 12.5 MG tablet  Commonly known as: COREG     cefUROXime 250 MG tablet  Commonly known as: CEFTIN  Take 1 tablet by mouth every 12 hours for 5 days     lisinopril 5 MG tablet  Commonly known as: PRINIVIL;ZESTRIL  Take 1 tablet by mouth daily     nitroGLYCERIN 0.4 MG SL tablet  Commonly known as: NITROSTAT  up to max of 3 total doses. If no relief after 1 dose, call 911.      polyethylene glycol 17 g packet  Commonly known as: GLYCOLAX  Take 17 g by mouth daily as needed for Constipation     Ventolin  (90 Base) MCG/ACT inhaler  Generic drug: albuterol sulfate HFA     vitamin D 1.25 MG (77490 UT) Caps capsule  Commonly known as: ERGOCALCIFEROL        STOP taking these medications

## 2020-12-17 NOTE — PROGRESS NOTES
HGB 15.0   HCT 45.8          No results for input(s): NA, K, CL, CO2, BUN, CREATININE, CALCIUM, PHOS in the last 72 hours. Invalid input(s): MAGNES    No results for input(s): PROT, ALB, ALKPHOS, ALT, AST, BILITOT, AMYLASE, LIPASE in the last 72 hours. No results for input(s): INR in the last 72 hours. No results for input(s): Sherron Lan in the last 72 hours. Chronic labs:    Lab Results   Component Value Date    PSA 1.30 12/15/2020    INR 0.9 12/29/2010       Radiology: REVIEWED DAILY    +++++++++++++++++++++++++++++++++++++++++++++++++  María Elena Moore Physician - Hospitalist  [address], [place]  +++++++++++++++++++++++++++++++++++++++++++++++++  NOTE: This report was transcribed using voice recognition software. Every effort was made to ensure accuracy; however, inadvertent computerized transcription errors may be present.

## 2020-12-17 NOTE — PLAN OF CARE
Pt denies SI HI and hallucinations. Pt is pleasant, bright, calm, cooperative. Pt is anxious for discharge. Pt is out on the unit social with staff and peers. Pt is medication compliant. Pt is eating provided meals and attending groups. No aggression. We will continue to provide support and comfort for the patient.      Problem: Depressive Behavior With or Without Suicide Precautions:  Goal: Able to verbalize acceptance of life and situations over which he or she has no control  Description: Able to verbalize acceptance of life and situations over which he or she has no control  12/17/2020 1017 by Chris Ford RN  Outcome: Met This Shift     Problem: Depressive Behavior With or Without Suicide Precautions:  Goal: Able to verbalize and/or display a decrease in depressive symptoms  Description: Able to verbalize and/or display a decrease in depressive symptoms  12/17/2020 1017 by Chris Ford RN  Outcome: Met This Shift     Problem: Depressive Behavior With or Without Suicide Precautions:  Goal: Ability to disclose and discuss suicidal ideas will improve  Description: Ability to disclose and discuss suicidal ideas will improve  12/17/2020 1017 by Chris Ford RN  Outcome: Met This Shift     Problem: Depressive Behavior With or Without Suicide Precautions:  Goal: Able to verbalize support systems  Description: Able to verbalize support systems  12/17/2020 1017 by Chris Ford RN  Outcome: Met This Shift     Problem: Depressive Behavior With or Without Suicide Precautions:  Goal: Absence of self-harm  Description: Absence of self-harm  Outcome: Met This Shift

## 2020-12-19 LAB
BLOOD CULTURE, ROUTINE: NORMAL
CULTURE, BLOOD 2: NORMAL

## 2021-01-13 PROBLEM — N39.0 UTI (URINARY TRACT INFECTION): Status: RESOLVED | Noted: 2020-12-14 | Resolved: 2021-01-13

## 2021-04-19 ENCOUNTER — INITIAL CONSULT (OUTPATIENT)
Dept: SURGERY | Age: 61
End: 2021-04-19
Payer: MEDICAID

## 2021-04-19 ENCOUNTER — TELEPHONE (OUTPATIENT)
Dept: SURGERY | Age: 61
End: 2021-04-19

## 2021-04-19 VITALS
RESPIRATION RATE: 16 BRPM | DIASTOLIC BLOOD PRESSURE: 110 MMHG | WEIGHT: 160 LBS | SYSTOLIC BLOOD PRESSURE: 185 MMHG | HEART RATE: 98 BPM | HEIGHT: 68 IN | BODY MASS INDEX: 24.25 KG/M2 | TEMPERATURE: 98 F

## 2021-04-19 DIAGNOSIS — D49.2 SOFT TISSUE NEOPLASM: Primary | ICD-10-CM

## 2021-04-19 PROCEDURE — G8420 CALC BMI NORM PARAMETERS: HCPCS | Performed by: SURGERY

## 2021-04-19 PROCEDURE — 3017F COLORECTAL CA SCREEN DOC REV: CPT | Performed by: SURGERY

## 2021-04-19 PROCEDURE — 99203 OFFICE O/P NEW LOW 30 MIN: CPT | Performed by: SURGERY

## 2021-04-19 PROCEDURE — 4004F PT TOBACCO SCREEN RCVD TLK: CPT | Performed by: SURGERY

## 2021-04-19 PROCEDURE — G8427 DOCREV CUR MEDS BY ELIG CLIN: HCPCS | Performed by: SURGERY

## 2021-04-19 RX ORDER — OXYCODONE AND ACETAMINOPHEN TABLETS 5; 300 MG/1; MG/1
TABLET ORAL
COMMUNITY
Start: 2021-02-26 | End: 2021-04-19

## 2021-04-19 NOTE — TELEPHONE ENCOUNTER
Prior Authorization Form:      DEMOGRAPHICS:                     Patient Name:  Odalys Villalobos  Patient :  1960            Insurance:  Payor: [employer] [facility] / Plan: [employer] [place] / Product Type: *No Product type* /   Insurance ID Number:    Payor/Plan Subscr  Sex Relation Sub.  Ins. ID Effective Group Num   1. [address]  1960 Male Self [id number]                                   [address]         DIAGNOSIS & PROCEDURE:                       Procedure/Operation: excision soft tissue neoplasm of back           CPT Code: 09198    Diagnosis:  Soft tissue neoplasm    ICD10 Code: D49.2    [facility]  [facility]    Surgeon:  Hali Barron INFORMATION:                          Date: 2021    Time:               Anesthesia:                                                         Status:  Outpatient        Special Comments:           Electronically signed by Alex Soares MA on 2021 at 1:45 PM

## 2021-04-19 NOTE — TELEPHONE ENCOUNTER
Patient provided with surgery instructions during office visit. Patient scheduled for follow up visit with Dr. Mae Coburn on 05/10/2021. Surgery scheduling form faxed and confirmation received.     Electronically signed by Danny Friend MA on 4/19/2021 at 1:48 PM

## 2021-04-19 NOTE — PROGRESS NOTES
5 MG tablet Take 1 tablet by mouth daily 30 tablet 0    melatonin 3 MG TABS tablet Take 2 tablets by mouth nightly 60 tablet 0     No current facility-administered medications for this visit. No Known Allergies    The patient has a family history that is negative for severe cardiovascular or respiratory issues, negative for reaction to anesthesia.     Social History     Socioeconomic History    Marital status:      Spouse name: Not on file    Number of children: 1    Years of education: 15    Highest education level: Not on file   Occupational History    Not on file   Social Needs    Financial resource strain: Not on file    Food insecurity     Worry: Not on file     Inability: Not on file   [employer] needs     Medical: Not on file     Non-medical: Not on file   Tobacco Use    Smoking status: Current Every Day Smoker     Packs/day: 0.50     Years: 40.00     Pack years: 20.00    Smokeless tobacco: Never Used   Substance and Sexual Activity    Alcohol use: Not Currently    Drug use: Yes     Types: Cocaine    Sexual activity: Yes     Partners: Female   Lifestyle    Physical activity     Days per week: Not on file     Minutes per session: Not on file    Stress: Not on file   Relationships    Social connections     Talks on phone: Not on file     Gets together: Not on file     Attends Roman [employer] service: Not on file     Active member of club or organization: Not on file     Attends meetings of clubs or organizations: Not on file     Relationship status: Not on file    Intimate partner violence     Fear of current or ex partner: Not on file     Emotionally abused: Not on file     Physically abused: Not on file     Forced sexual activity: Not on file   Other Topics Concern    Not on file   Social History Narrative    ** Merged History Encounter **                Review of Systems  Review of Systems -  General ROS: negative for - chills, fatigue or malaise  ENT ROS: negative for - hearing change, nasal congestion or nasal discharge  Allergy and Immunology ROS: negative for - hives, itchy/watery eyes or nasal congestion  Hematological and Lymphatic ROS: negative for - blood clots, blood transfusions, bruising or fatigue  Endocrine ROS: negative for - malaise/lethargy, mood swings, palpitations or polydipsia/polyuria  Breast ROS: negative for - new or changing breast lumps or nipple changes  Respiratory ROS: negative for - sputum changes, stridor, tachypnea or wheezing  Cardiovascular ROS: negative for - irregular heartbeat, loss of consciousness, murmur or orthopnea  Gastrointestinal ROS: negative for - constipation, diarrhea, gas/bloating, heartburn or hematemesis  Genito-Urinary ROS: negative for -  genital discharge, genital ulcers or hematuria  Musculoskeletal ROS: negative for - gait disturbance, muscle pain or muscular weakness      Physical exam:   BP (!) 185/110 Comment: Did not take medications today  Pulse 98   Temp 98 °F (36.7 °C) (Oral)   Resp 16   Ht 5' 8\" (1.727 m)   Wt 160 lb (72.6 kg)   BMI 24.33 kg/m²   General appearance:  NAD  Head: NCAT, PERRLA, EOMI, red conjunctiva  Neck: supple, no masses  Lungs: CTAB, equal chest rise bilateral  Heart: Reg rate  Abdomen: soft, nontender, nondistended  Skin; soft tissue neoplasm of back that is soft rubbery and mobile. Gu: no cva tenderness  Extremities: extremities normal, atraumatic, no cyanosis or edema  Pyschosocial: normal affect, no signs of depression      Assessment:  64 y.o. male with soft tissue neoplasm of back    Plan: To OR for excision Discussed the risk, benefits and alternatives of surgery including wound infections, bleeding, scar  and the risks of  anesthetic including MI, CVA, sudden death or reactions to anesthetic medications. The patient understands the risks and alternatives. All questions were answered to the patient's satisfaction and they freely signed the consent.     Kerri Sandoval MD  1:31 PM  4/19/2021

## 2021-04-20 ENCOUNTER — PREP FOR PROCEDURE (OUTPATIENT)
Dept: SURGERY | Age: 61
End: 2021-04-20

## 2021-04-20 RX ORDER — SODIUM CHLORIDE 0.9 % (FLUSH) 0.9 %
10 SYRINGE (ML) INJECTION PRN
Status: CANCELLED | OUTPATIENT
Start: 2021-04-20

## 2021-04-20 RX ORDER — SODIUM CHLORIDE 0.9 % (FLUSH) 0.9 %
10 SYRINGE (ML) INJECTION EVERY 12 HOURS SCHEDULED
Status: CANCELLED | OUTPATIENT
Start: 2021-04-20

## 2021-04-20 RX ORDER — SODIUM CHLORIDE 9 MG/ML
25 INJECTION, SOLUTION INTRAVENOUS PRN
Status: CANCELLED | OUTPATIENT
Start: 2021-04-20

## 2021-04-22 ENCOUNTER — HOSPITAL ENCOUNTER (OUTPATIENT)
Age: 61
Discharge: HOME | End: 2021-04-24
Payer: MEDICAID

## 2021-04-22 DIAGNOSIS — Z01.818 PREOP TESTING: ICD-10-CM

## 2021-04-22 PROCEDURE — U0003 INFECTIOUS AGENT DETECTION BY NUCLEIC ACID (DNA OR RNA); SEVERE ACUTE RESPIRATORY SYNDROME CORONAVIRUS 2 (SARS-COV-2) (CORONAVIRUS DISEASE [COVID-19]), AMPLIFIED PROBE TECHNIQUE, MAKING USE OF HIGH THROUGHPUT TECHNOLOGIES AS DESCRIBED BY CMS-2020-01-R: HCPCS

## 2021-04-23 LAB
SARS-COV-2: NOT DETECTED
SOURCE: NORMAL

## 2021-04-26 RX ORDER — ZOLPIDEM TARTRATE 5 MG/1
5 TABLET ORAL NIGHTLY PRN
COMMUNITY

## 2021-04-26 NOTE — PROGRESS NOTES
Michael Raya        Date: 4/26/2021    Date of surgery:  4/27/2021    Arrival Time: Hospital will call you between 5pm and 7pm with your final arrival time for surgery    1. Do not eat or drink anything after   Midnight  prior to surgery. This includes no water, chewing gum, mints or ice chips. 2. Take the following medications with a small sip of water on the morning of Surgery:  Use inhaler if needed and bring take trileptal coreg  dos with sip water    3. Diabetics may take evening dose of insulin but none after midnight. If you feel symptomatic or low blood sugar morning of surgery drink 1-2 ounces of apple juice only. 4. Aspirin, Ibuprofen, Advil, Naproxen, Vitamin E and other Anti-inflammatory products should be stopped  before surgery  as directed by your physician. Take Tylenol only unless instructed otherwise by your surgeon. 5. Check with your Doctor regarding stopping Plavix, Coumadin, Lovenox, Eliquis, Effient, or other blood thinners. 6. Do not smoke,use illicit drugs and do not drink any alcoholic beverages 24 hours prior to surgery. 7. You may brush your teeth the morning of surgery. DO NOT SWALLOW WATER    8. You MUST make arrangements for a responsible adult to take you home after your surgery. You will not be allowed to leave alone or drive yourself home. It is strongly suggested someone stay with you the first 24 hrs. Your surgery will be cancelled if you do not have a ride home. 9. PEDIATRIC PATIENTS ONLY:  A parent/legal guardian must accompany a child scheduled for surgery and plan to stay at the hospital until the child is discharged. Please do not bring other children with you.     10. Please wear simple, loose fitting clothing to the hospital.  Do not bring valuables (money, credit cards, checkbooks, etc.) Do not wear any makeup (including no eye makeup) or nail polish on your fingers or toes. 11. DO NOT wear any jewelry or piercings on day of surgery. All body piercing jewelry must be removed. 12. Shower the night before surgery with _x__Antibacterial soap /[place] WIPES________    13. TOTAL JOINT REPLACEMENT/HYSTERECTOMY PATIENTS ONLY---Remember to bring Blood Bank bracelet to the hospital on the day of surgery. 14. If you have a Living Will and Durable Power of  for Healthcare, please bring in a copy. 15. If appropriate bring crutches, inspirex, WALKER, CANE etc... 12. Notify your Surgeon if you develop any illness between now and surgery time, cough, cold, fever, sore throat, nausea, vomiting, etc.  Please notify your surgeon if you experience dizziness, shortness of breath or blurred vision between now & the time of your surgery. 17. If you have ___dentures, they will be removed before going to the OR; we will provide you a container. If you wear ___contact lenses or ___glasses, they will be removed; please bring a case for them. 18. To provide excellent care visitors will be limited to 2 in the room at any given time. 19. Please bring picture ID and insurance card. 20. Sleep apnea patients need to bring CPAP AND SETTINGS to hospital on day of surgery. 21. During flu season no children under the age of 15 are permitted in the hospital for the safety of all patients. 22. Other                  Please call AMBULATORY CARE if you have any further questions.    [facility]     [address]

## 2021-04-26 NOTE — PROGRESS NOTES
Dr Ryne Ttae notified pt will be going home per cab by self but has a friend at home to stay him ok to proceed

## 2021-04-27 ENCOUNTER — ANESTHESIA EVENT (OUTPATIENT)
Dept: OPERATING ROOM | Age: 61
End: 2021-04-27
Payer: MEDICAID

## 2021-04-27 ENCOUNTER — HOSPITAL ENCOUNTER (OUTPATIENT)
Age: 61
Setting detail: OUTPATIENT SURGERY
Discharge: HOME | End: 2021-04-27
Attending: SURGERY | Admitting: SURGERY
Payer: MEDICAID

## 2021-04-27 ENCOUNTER — TELEPHONE (OUTPATIENT)
Dept: SURGERY | Age: 61
End: 2021-04-27

## 2021-04-27 ENCOUNTER — ANESTHESIA (OUTPATIENT)
Dept: OPERATING ROOM | Age: 61
End: 2021-04-27
Payer: MEDICAID

## 2021-04-27 VITALS
TEMPERATURE: 97.1 F | HEIGHT: 68 IN | RESPIRATION RATE: 18 BRPM | DIASTOLIC BLOOD PRESSURE: 94 MMHG | SYSTOLIC BLOOD PRESSURE: 147 MMHG | BODY MASS INDEX: 24.25 KG/M2 | HEART RATE: 87 BPM | WEIGHT: 160 LBS | OXYGEN SATURATION: 96 %

## 2021-04-27 VITALS
OXYGEN SATURATION: 98 % | DIASTOLIC BLOOD PRESSURE: 70 MMHG | RESPIRATION RATE: 17 BRPM | SYSTOLIC BLOOD PRESSURE: 113 MMHG

## 2021-04-27 DIAGNOSIS — Z01.818 PREOP TESTING: Primary | ICD-10-CM

## 2021-04-27 LAB
ANION GAP SERPL CALCULATED.3IONS-SCNC: 11 MMOL/L (ref 7–16)
BUN BLDV-MCNC: 12 MG/DL (ref 6–23)
CALCIUM SERPL-MCNC: 9.2 MG/DL (ref 8.6–10.2)
CHLORIDE BLD-SCNC: 100 MMOL/L (ref 98–107)
CO2: 23 MMOL/L (ref 22–29)
CREAT SERPL-MCNC: 1 MG/DL (ref 0.7–1.2)
GFR AFRICAN AMERICAN: >60
GFR NON-AFRICAN AMERICAN: >60 ML/MIN/1.73
GLUCOSE BLD-MCNC: 105 MG/DL (ref 74–99)
HCT VFR BLD CALC: 49.8 % (ref 37–54)
HEMOGLOBIN: 17.2 G/DL (ref 12.5–16.5)
MCH RBC QN AUTO: 29.7 PG (ref 26–35)
MCHC RBC AUTO-ENTMCNC: 34.5 % (ref 32–34.5)
MCV RBC AUTO: 85.9 FL (ref 80–99.9)
PDW BLD-RTO: 13.3 FL (ref 11.5–15)
PLATELET # BLD: 170 E9/L (ref 130–450)
PMV BLD AUTO: 8.8 FL (ref 7–12)
POTASSIUM REFLEX MAGNESIUM: 4 MMOL/L (ref 3.5–5)
RBC # BLD: 5.8 E12/L (ref 3.8–5.8)
SODIUM BLD-SCNC: 134 MMOL/L (ref 132–146)
WBC # BLD: 5.9 E9/L (ref 4.5–11.5)

## 2021-04-27 PROCEDURE — 2709999900 HC NON-CHARGEABLE SUPPLY: Performed by: SURGERY

## 2021-04-27 PROCEDURE — 2580000003 HC RX 258: Performed by: ANESTHESIOLOGY

## 2021-04-27 PROCEDURE — 7100000010 HC PHASE II RECOVERY - FIRST 15 MIN: Performed by: SURGERY

## 2021-04-27 PROCEDURE — 99024 POSTOP FOLLOW-UP VISIT: CPT | Performed by: SURGERY

## 2021-04-27 PROCEDURE — 3600000012 HC SURGERY LEVEL 2 ADDTL 15MIN: Performed by: SURGERY

## 2021-04-27 PROCEDURE — 2780000010 HC IMPLANT OTHER: Performed by: SURGERY

## 2021-04-27 PROCEDURE — 6360000002 HC RX W HCPCS: Performed by: SURGERY

## 2021-04-27 PROCEDURE — 3700000000 HC ANESTHESIA ATTENDED CARE: Performed by: SURGERY

## 2021-04-27 PROCEDURE — 85027 COMPLETE CBC AUTOMATED: CPT

## 2021-04-27 PROCEDURE — 36415 COLL VENOUS BLD VENIPUNCTURE: CPT

## 2021-04-27 PROCEDURE — 2500000003 HC RX 250 WO HCPCS: Performed by: SURGERY

## 2021-04-27 PROCEDURE — 3600000002 HC SURGERY LEVEL 2 BASE: Performed by: SURGERY

## 2021-04-27 PROCEDURE — 11423 EXC H-F-NK-SP B9+MARG 2.1-3: CPT | Performed by: SURGERY

## 2021-04-27 PROCEDURE — 3700000001 HC ADD 15 MINUTES (ANESTHESIA): Performed by: SURGERY

## 2021-04-27 PROCEDURE — 7100000011 HC PHASE II RECOVERY - ADDTL 15 MIN: Performed by: SURGERY

## 2021-04-27 PROCEDURE — 80048 BASIC METABOLIC PNL TOTAL CA: CPT

## 2021-04-27 PROCEDURE — 6360000002 HC RX W HCPCS: Performed by: NURSE ANESTHETIST, CERTIFIED REGISTERED

## 2021-04-27 PROCEDURE — 88304 TISSUE EXAM BY PATHOLOGIST: CPT

## 2021-04-27 RX ORDER — SODIUM CHLORIDE, SODIUM LACTATE, POTASSIUM CHLORIDE, CALCIUM CHLORIDE 600; 310; 30; 20 MG/100ML; MG/100ML; MG/100ML; MG/100ML
INJECTION, SOLUTION INTRAVENOUS CONTINUOUS
Status: DISCONTINUED | OUTPATIENT
Start: 2021-04-27 | End: 2021-04-27 | Stop reason: HOSPADM

## 2021-04-27 RX ORDER — MEPERIDINE HYDROCHLORIDE 25 MG/ML
12.5 INJECTION INTRAMUSCULAR; INTRAVENOUS; SUBCUTANEOUS EVERY 5 MIN PRN
Status: DISCONTINUED | OUTPATIENT
Start: 2021-04-27 | End: 2021-04-27 | Stop reason: HOSPADM

## 2021-04-27 RX ORDER — CEFAZOLIN SODIUM 2 G/50ML
2000 SOLUTION INTRAVENOUS
Status: COMPLETED | OUTPATIENT
Start: 2021-04-27 | End: 2021-04-27

## 2021-04-27 RX ORDER — SODIUM CHLORIDE 0.9 % (FLUSH) 0.9 %
10 SYRINGE (ML) INJECTION EVERY 12 HOURS SCHEDULED
Status: DISCONTINUED | OUTPATIENT
Start: 2021-04-27 | End: 2021-04-27 | Stop reason: HOSPADM

## 2021-04-27 RX ORDER — LIDOCAINE HYDROCHLORIDE 20 MG/ML
INJECTION, SOLUTION INTRAVENOUS PRN
Status: DISCONTINUED | OUTPATIENT
Start: 2021-04-27 | End: 2021-04-27 | Stop reason: SDUPTHER

## 2021-04-27 RX ORDER — MIDAZOLAM HYDROCHLORIDE 1 MG/ML
INJECTION INTRAMUSCULAR; INTRAVENOUS PRN
Status: DISCONTINUED | OUTPATIENT
Start: 2021-04-27 | End: 2021-04-27 | Stop reason: SDUPTHER

## 2021-04-27 RX ORDER — BUPIVACAINE HYDROCHLORIDE AND EPINEPHRINE 2.5; 5 MG/ML; UG/ML
INJECTION, SOLUTION EPIDURAL; INFILTRATION; INTRACAUDAL; PERINEURAL PRN
Status: DISCONTINUED | OUTPATIENT
Start: 2021-04-27 | End: 2021-04-27 | Stop reason: ALTCHOICE

## 2021-04-27 RX ORDER — PROPOFOL 10 MG/ML
INJECTION, EMULSION INTRAVENOUS CONTINUOUS PRN
Status: DISCONTINUED | OUTPATIENT
Start: 2021-04-27 | End: 2021-04-27 | Stop reason: SDUPTHER

## 2021-04-27 RX ORDER — SODIUM CHLORIDE 0.9 % (FLUSH) 0.9 %
10 SYRINGE (ML) INJECTION PRN
Status: DISCONTINUED | OUTPATIENT
Start: 2021-04-27 | End: 2021-04-27 | Stop reason: HOSPADM

## 2021-04-27 RX ORDER — SODIUM CHLORIDE 9 MG/ML
25 INJECTION, SOLUTION INTRAVENOUS PRN
Status: DISCONTINUED | OUTPATIENT
Start: 2021-04-27 | End: 2021-04-27 | Stop reason: HOSPADM

## 2021-04-27 RX ORDER — FENTANYL CITRATE 50 UG/ML
INJECTION, SOLUTION INTRAMUSCULAR; INTRAVENOUS PRN
Status: DISCONTINUED | OUTPATIENT
Start: 2021-04-27 | End: 2021-04-27 | Stop reason: SDUPTHER

## 2021-04-27 RX ADMIN — FENTANYL CITRATE 100 MCG: 50 INJECTION, SOLUTION INTRAMUSCULAR; INTRAVENOUS at 13:27

## 2021-04-27 RX ADMIN — LIDOCAINE HYDROCHLORIDE 20 MG: 20 INJECTION, SOLUTION INTRAVENOUS at 13:27

## 2021-04-27 RX ADMIN — CEFAZOLIN SODIUM 2000 MG: 2 SOLUTION INTRAVENOUS at 13:28

## 2021-04-27 RX ADMIN — MIDAZOLAM 2 MG: 1 INJECTION INTRAMUSCULAR; INTRAVENOUS at 13:23

## 2021-04-27 RX ADMIN — SODIUM CHLORIDE, POTASSIUM CHLORIDE, SODIUM LACTATE AND CALCIUM CHLORIDE: 600; 310; 30; 20 INJECTION, SOLUTION INTRAVENOUS at 11:10

## 2021-04-27 RX ADMIN — PROPOFOL INJECTABLE EMULSION 100 MCG/KG/MIN: 10 INJECTION, EMULSION INTRAVENOUS at 13:28

## 2021-04-27 ASSESSMENT — PULMONARY FUNCTION TESTS
PIF_VALUE: 1

## 2021-04-27 ASSESSMENT — PAIN - FUNCTIONAL ASSESSMENT: PAIN_FUNCTIONAL_ASSESSMENT: 0-10

## 2021-04-27 ASSESSMENT — LIFESTYLE VARIABLES: SMOKING_STATUS: 1

## 2021-04-27 NOTE — TELEPHONE ENCOUNTER
Patient scheduled with Dr. Arnold Lagunas in [place], [address] [place] [address], on 06/04/2021 at [phone]. Patient will be calling back to write this down when he gets home from surgery.     Electronically signed by Anna Serrano MA on 4/27/2021 at 9:47 AM

## 2021-04-27 NOTE — H&P
mg by mouth 2 times daily         OXcarbazepine (TRILEPTAL) 300 MG tablet Take 1 tablet by mouth 2 times daily 60 tablet 0    ARIPiprazole (ABILIFY) 5 MG tablet Take 1 tablet by mouth daily 30 tablet 0    melatonin 3 MG TABS tablet Take 2 tablets by mouth nightly 60 tablet 0      No current facility-administered medications for this visit.             No Known Allergies     The patient has a family history that is negative for severe cardiovascular or respiratory issues, negative for reaction to anesthesia.     Social History               Socioeconomic History    Marital status:        Spouse name: Not on file    Number of children: 1    Years of education: 15    Highest education level: Not on file   Occupational History    Not on file   Social Needs    Financial resource strain: Not on file    Food insecurity       Worry: Not on file       Inability: Not on file    Transportation needs       Medical: Not on file       Non-medical: Not on file   Tobacco Use    Smoking status: Current Every Day Smoker       Packs/day: 0.50       Years: 40.00       Pack years: 20.00    Smokeless tobacco: Never Used   Substance and Sexual Activity    Alcohol use: Not Currently    Drug use:  Yes       Types: Cocaine    Sexual activity: Yes       Partners: Female   Lifestyle    Physical activity       Days per week: Not on file       Minutes per session: Not on file    Stress: Not on file   Relationships    Social connections       Talks on phone: Not on file       Gets together: Not on file       Attends Muslim service: Not on file       Active member of club or organization: Not on file       Attends meetings of clubs or organizations: Not on file       Relationship status: Not on file    Intimate partner violence       Fear of current or ex partner: Not on file       Emotionally abused: Not on file       Physically abused: Not on file       Forced sexual activity: Not on file   Other Topics Concern    Not on file   Social History Narrative     ** Merged History Encounter **                        Review of Systems  Review of Systems -  General ROS: negative for - chills, fatigue or malaise  ENT ROS: negative for - hearing change, nasal congestion or nasal discharge  Allergy and Immunology ROS: negative for - hives, itchy/watery eyes or nasal congestion  Hematological and Lymphatic ROS: negative for - blood clots, blood transfusions, bruising or fatigue  Endocrine ROS: negative for - malaise/lethargy, mood swings, palpitations or polydipsia/polyuria  Breast ROS: negative for - new or changing breast lumps or nipple changes  Respiratory ROS: negative for - sputum changes, stridor, tachypnea or wheezing  Cardiovascular ROS: negative for - irregular heartbeat, loss of consciousness, murmur or orthopnea  Gastrointestinal ROS: negative for - constipation, diarrhea, gas/bloating, heartburn or hematemesis  Genito-Urinary ROS: negative for -  genital discharge, genital ulcers or hematuria  Musculoskeletal ROS: negative for - gait disturbance, muscle pain or muscular weakness        Physical exam:   BP (!) 185/110 Comment: Did not take medications today  Pulse 98   Temp 98 °F (36.7 °C) (Oral)   Resp 16   Ht 5' 8\" (1.727 m)   Wt 160 lb (72.6 kg)   BMI 24.33 kg/m²   General appearance:  NAD  Head: NCAT, PERRLA, EOMI, red conjunctiva  Neck: supple, no masses  Lungs: CTAB, equal chest rise bilateral  Heart: Reg rate  Abdomen: soft, nontender, nondistended  Skin; soft tissue neoplasm of back that is soft rubbery and mobile. Gu: no cva tenderness  Extremities: extremities normal, atraumatic, no cyanosis or edema  Pyschosocial: normal affect, no signs of depression        Assessment:  64 y.o. male with soft tissue neoplasm of back     Plan:   To OR for excision Discussed the risk, benefits and alternatives of surgery including wound infections, bleeding, scar  and the risks of  anesthetic including MI, CVA, sudden death or reactions to anesthetic medications. The patient understands the risks and alternatives.  All questions were answered to the patient's satisfaction and they freely signed the consent.  Signa Primrose, MD

## 2021-04-27 NOTE — OP NOTE
DATE OF PROCEDURE: 4/27/2021   SURGEON: BRYCE Del Angel M.D.   [facility]. PREOPERATIVE DIAGNOSIS: painful back cyst, 3 cm in size. POSTOPERATIVE DIAGNOSIS: same. OPERATION: Excision of painful back cyst, 3 cm in size. ANESTHESIA: LMAC and local.   ESTIMATED BLOOD LOSS: Minimal.   COMPLICATIONS: None. FLUIDS: Crystalloid. DISPOSITION: Discharged home. SPECIMEN: back cyst.   PROCEDURE: The patient was brought into the operative suite and was placed   under [facility] anesthesia; was infused with local anesthetic after being prepped   and draped in a normal sterile condition. Once this was done, approximately a 1x3-cm incision was made in the mid upper back. Once this was done, the incision was carried down through the   dermis with electrocautery. This was then delivered and sent for   specimen. The wound was sterilely irrigated, and electrocautery was used to obtain   hemostasis. Once this was done, deep dermal stitches were placed with a 3-0   Vicryl suture, and a running 4-0 Vicryl suture was used in a subcuticular   fashion to approximate the skin. Finally, Dermabond was placed, and the   patient was woken up in stable condition and taken to PACU.

## 2021-04-27 NOTE — ANESTHESIA PRE PROCEDURE
Department of Anesthesiology  Preprocedure Note       Name:  Chhaya Lazo   Age:  64 y.o.  :  1960                                          MRN:  [id number]         Date:  2021      Surgeon: Cristal Reyes):  Carie Thomas MD    Procedure: Procedure(s):  EXCISION SOFT TISSUE NEOPLASM OF BACK **DO NOT CHANGE TIME-TRANSPORTATION**    Medications prior to admission:   Prior to Admission medications    Medication Sig Start Date End Date Taking? Authorizing Provider   zolpidem (AMBIEN) 5 MG tablet Take 5 mg by mouth nightly as needed for Sleep. Yes Historical Provider, MD   OXcarbazepine (TRILEPTAL) 300 MG tablet Take 1 tablet by mouth 2 times daily 20 Yes FANY Duenas CNP   ARIPiprazole (ABILIFY) 5 MG tablet Take 1 tablet by mouth daily  Patient taking differently: Take 5 mg by mouth daily 7-8 pm 20 Yes FANY Duenas CNP   melatonin 3 MG TABS tablet Take 2 tablets by mouth nightly 20 Yes FANY Duenas CNP   aspirin 325 MG EC tablet Take 1 tablet by mouth daily  Patient taking differently: Take 325 mg by mouth daily Last dose  20  Yes Madison Webb DO   lisinopril (PRINIVIL;ZESTRIL) 5 MG tablet Take 1 tablet by mouth daily 20  Yes Madison Webb DO   albuterol sulfate HFA (VENTOLIN HFA) 108 (90 Base) MCG/ACT inhaler Inhale 2 puffs into the lungs every 6 hours as needed for Wheezing or Shortness of Breath   Yes Historical Provider, MD   vitamin D (ERGOCALCIFEROL) 1.25 MG (79289 UT) CAPS capsule Take 50,000 Units by mouth once a week Tuesday   Yes Historical Provider, MD   atorvastatin (LIPITOR) 40 MG tablet Take 1 tablet by mouth nightly 19  Yes Shanna Crowell MD   nitroGLYCERIN (NITROSTAT) 0.4 MG SL tablet up to max of 3 total doses.  If no relief after 1 dose, call 911. 19  Yes Shanna Crowell MD   carvedilol (COREG) 12.5 MG tablet Take 12.5 mg by mouth 2 times daily    Yes Historical Provider, MD Current medications:    No current facility-administered medications for this encounter. Current Outpatient Medications   Medication Sig Dispense Refill    zolpidem (AMBIEN) 5 MG tablet Take 5 mg by mouth nightly as needed for Sleep.  OXcarbazepine (TRILEPTAL) 300 MG tablet Take 1 tablet by mouth 2 times daily 60 tablet 0    ARIPiprazole (ABILIFY) 5 MG tablet Take 1 tablet by mouth daily (Patient taking differently: Take 5 mg by mouth daily 7-8 pm) 30 tablet 0    melatonin 3 MG TABS tablet Take 2 tablets by mouth nightly 60 tablet 0    aspirin 325 MG EC tablet Take 1 tablet by mouth daily (Patient taking differently: Take 325 mg by mouth daily Last dose  4/23/2021) 30 tablet 0    lisinopril (PRINIVIL;ZESTRIL) 5 MG tablet Take 1 tablet by mouth daily 30 tablet 3    albuterol sulfate HFA (VENTOLIN HFA) 108 (90 Base) MCG/ACT inhaler Inhale 2 puffs into the lungs every 6 hours as needed for Wheezing or Shortness of Breath      vitamin D (ERGOCALCIFEROL) 1.25 MG (39381 UT) CAPS capsule Take 50,000 Units by mouth once a week Tuesday      atorvastatin (LIPITOR) 40 MG tablet Take 1 tablet by mouth nightly 30 tablet 3    nitroGLYCERIN (NITROSTAT) 0.4 MG SL tablet up to max of 3 total doses.  If no relief after 1 dose, call 911. 25 tablet 3    carvedilol (COREG) 12.5 MG tablet Take 12.5 mg by mouth 2 times daily          Allergies:  No Known Allergies    Problem List:    Patient Active Problem List   Diagnosis Code    Chest pain R07.9    CAD (coronary artery disease) I25.10    S/P AVR (aortic valve replacement) Z95.2    TIA (transient ischemic attack) G45.9    Suicide attempt ([facility] T14.91XA    Nonobstructive atherosclerosis of coronary artery I25.10    Acute cystitis without hematuria N30.00    Essential hypertension I10    Overdose of antihypertensive agent T46.5X1A    Acute kidney injury ([facility] N17.9    Depression with suicidal ideation F32.9, R45.851    Bipolar 2 disorder, major depressive episode ([place] [address] F31.81    Cocaine abuse ([place] [address] F14.10    Bacteremia R78.81       Past Medical History:        Diagnosis Date    Aortic stenosis     Blocked artery     CAD (coronary artery disease)     Cerebral artery occlusion with [facility] infarction ([facility]     COPD (chronic obstructive pulmonary disease) (HCC)     beginning    Hyperlipidemia     Hypertension     Suicide attempt ([facility]     Unspecified cerebral artery occlusion with cerebral infarction     2010       Past Surgical History:        Procedure Laterality Date    AORTIC VALVE REPLACEMENT N/A 2018    [facility]    DIAGNOSTIC CARDIAC CATH LAB PROCEDURE  06/21/2019    mRCA 40%, ostial LM 30-40%, mLAD 40%, ostium of LCx 60%    ECHO COMPL W DOP COLOR FLOW  8/24/2013         EYE SURGERY      cyst removal above left eye    KNEE SURGERY      right       Social History:    Social History     Tobacco Use    Smoking status: Current Every Day Smoker     Packs/day: 0.50     Years: 40.00     Pack years: 20.00    Smokeless tobacco: Never Used   Substance Use Topics    Alcohol use: Not Currently                                Ready to quit: Not Answered  Counseling given: Not Answered      Vital Signs (Current):   Vitals:    04/26/21 1612   Weight: 160 lb (72.6 kg)   Height: 5' 8\" (1.727 m)                                              BP Readings from Last 3 Encounters:   04/19/21 (!) 185/110   12/12/20 (!) 140/84   09/07/19 (!) 149/101       NPO Status:                                                                                 BMI:   Wt Readings from Last 3 Encounters:   04/19/21 160 lb (72.6 kg)   12/10/20 160 lb (72.6 kg)   09/07/19 160 lb (72.6 kg)     Body mass index is 24.33 kg/m².     CBC:   Lab Results   Component Value Date    WBC 6.2 12/14/2020    RBC 5.00 12/14/2020    RBC 4.58 06/20/2019    HGB 15.0 12/14/2020    HCT 45.8 12/14/2020    MCV 91.6 12/14/2020    RDW 12.9 12/14/2020     12/14/2020       CMP:   Lab Results   Component Value Date  12/11/2020    K 4.8 12/11/2020     12/11/2020    CO2 23 12/11/2020    BUN 16 12/11/2020    CREATININE 1.0 12/11/2020    GFRAA >60 12/11/2020    LABGLOM >60 12/11/2020    GLUCOSE 107 12/11/2020    GLUCOSE 98 12/27/2010    PROT 6.5 12/11/2020    CALCIUM 9.0 12/11/2020    BILITOT 0.2 12/11/2020    ALKPHOS 71 12/11/2020    AST 14 12/11/2020    ALT 9 12/11/2020       POC Tests: No results for input(s): POCGLU, POCNA, POCK, POCCL, POCBUN, POCHEMO, POCHCT in the last 72 hours. Coags:   Lab Results   Component Value Date    PROTIME 11.8 12/29/2010    INR 0.9 12/29/2010    APTT 28.4 12/29/2010       HCG (If Applicable): No results found for: PREGTESTUR, PREGSERUM, HCG, HCGQUANT     ABGs: No results found for: PHART, PO2ART, PND8ZKK, GRU6NQZ, BEART, S8TWORKM     Type & Screen (If Applicable):  No results found for: LABABO, LABRH    Drug/Infectious Status (If Applicable):  No results found for: HIV, HEPCAB    COVID-19 Screening (If Applicable):   Lab Results   Component Value Date    COVID19 Not Detected 04/22/2021    COVID19 Not Detected 12/10/2020           Anesthesia Evaluation  Patient summary reviewed  Airway: Mallampati: II  TM distance: >3 FB   Neck ROM: full  Mouth opening: > = 3 FB Dental:    (+) upper dentures, lower dentures and edentulous      Pulmonary: breath sounds clear to auscultation  (+) COPD:  current smoker          Patient smoked on day of surgery. Cardiovascular:    (+) hypertension:, valvular problems/murmurs: AS, CAD:, hyperlipidemia      ECG reviewed  Rhythm: regular  Rate: normal  Echocardiogram reviewed         Beta Blocker:  Dose within 24 Hrs      ROS comment: EKG: Normal Sinus Rhythm 76, Lt Bundle Branch Block. CATH:  Non Obstructive CAD. ECHO:  LEFT VENTRICLE:  The cavity size is normal. Wall thickness is mildly increased. Systolic function is normal by the biplane method of disks. The estimated ejection fraction is 62%.  There are no regional wall motion intravenous. BIS  MIPS: Postoperative opioids intended. Anesthetic plan and risks discussed with patient. Plan discussed with CRNA.     Attending anesthesiologist reviewed and agrees with Jo Solitario MD   4/27/2021

## 2021-12-08 NOTE — PROGRESS NOTES
Patient is resting in bed quietly with eyes closed. No apparent distress noted. Will continue to monitor. 08-Dec-2021 13:51

## 2022-07-14 VITALS
OXYGEN SATURATION: 98 % | DIASTOLIC BLOOD PRESSURE: 84 MMHG | WEIGHT: 159 LBS | SYSTOLIC BLOOD PRESSURE: 132 MMHG | HEART RATE: 92 BPM | TEMPERATURE: 97.5 F | BODY MASS INDEX: 24.1 KG/M2 | HEIGHT: 68 IN

## 2023-12-17 NOTE — PROGRESS NOTES
Group Therapy Note    Patient attended goals group and stated daily goal as to have my self control. Group Therapy Note    Date: 12/14/2020  Start Time: 9:45  End Time:  10:15  Number of Participants: 8    Type of Group: Psychoeducation    Wellness Binder Information  Module Name:  Coping Skills  Session Number: NA    Patient's Goal: To id positive coping skills to use on a daily basis. Notes: Attended group and was able to participate in discussion. Status After Intervention:  Unchanged    Participation Level:  Active Listener    Participation Quality: Attentive and Sharing      Speech:  hesitant      Thought Process/Content: Linear      Affective Functioning: Flat      Mood: anxious and depressed      Level of consciousness:  Alert      Response to Learning: Progressing to goal      Endings: None Reported    Modes of Intervention: Education      Discipline Responsible: Psychoeducational Specialist      Signature:  JERICHO Hernandez Group Topic: BH Therapeutic Activity    Date: 12/17/2023  Start Time: 1415  End Time: 1500  Facilitators: Justin Barnes LPC; Estefany Bland LPC    Focus: Making A Vision Board   Number in attendance: 6  Visualization is one of the most powerful mind exercises you can do. Each area of our lives affects the other, so starting with one central vision board usually makes sense.  Your vision board helps you to focus on how you want to feel. The more you focus on how you want to feel , the more it will come to life. Your vision board is anything that inspires and motivates you. The purpose of your vision board is to bring everything on it to life.     Patients were provided with blank sheets and invited to create a vision board.     Pt was recruited for group but did not attend. Efforts to encourage participation in programming on the unit will continue.    Justin Barnes LPC

## (undated) DEVICE — YANKAUER,BULB TIP,W/O VENT,RIGID,STERILE: Brand: MEDLINE

## (undated) DEVICE — COVER,LIGHT HANDLE,FLX,1/PK: Brand: MEDLINE INDUSTRIES, INC.

## (undated) DEVICE — PAD,NON-ADHERENT,3X8,STERILE,LF,1/PK: Brand: MEDLINE

## (undated) DEVICE — GAUZE,SPONGE,4"X4",16PLY,XRAY,STRL,LF: Brand: MEDLINE

## (undated) DEVICE — ELECTRODE PT RET AD L9FT HI MOIST COND ADH HYDRGEL CORDED

## (undated) DEVICE — NEEDLE HYPO 25GA L1.5IN BLU POLYPR HUB S STL REG BVL STR

## (undated) DEVICE — ELECTRODE NDL L2.8IN COAT LO PWR SET EDGE

## (undated) DEVICE — SET SURG INSTR DISSECT

## (undated) DEVICE — DOUBLE BASIN SET: Brand: MEDLINE INDUSTRIES, INC.

## (undated) DEVICE — APPLICATOR MEDICATED 26 CC SOLUTION HI LT ORNG CHLORAPREP

## (undated) DEVICE — PACK,LAPAROTOMY,NO GOWNS: Brand: MEDLINE

## (undated) DEVICE — SYRINGE MED 10ML TRNSLUC BRL PLUNG BLK MRK POLYPR CTRL

## (undated) DEVICE — GLOVE ORANGE PI 8   MSG9080

## (undated) DEVICE — GLOVE ORANGE PI 7 1/2   MSG9075

## (undated) DEVICE — GOWN,SIRUS,FABRNF,XL,20/CS: Brand: MEDLINE

## (undated) DEVICE — TUBING, SUCTION, 1/4" X 10', STRAIGHT: Brand: MEDLINE

## (undated) DEVICE — BLADE ES ELASTOMERIC COAT INSUL DURABLE BEND UPTO 90DEG

## (undated) DEVICE — PENCIL ES L3M BTTN SWCH HOLSTER W/ BLDE ELECTRD EDGE

## (undated) DEVICE — TOWEL,OR,DSP,ST,BLUE,STD,6/PK,12PK/CS: Brand: MEDLINE

## (undated) DEVICE — MARKER,SKIN,WI/RULER AND LABELS: Brand: MEDLINE

## (undated) DEVICE — AGENT HEMSTAT 3GM PURIFIED PLNT STARCH PWD ABSRB ARISTA AH

## (undated) DEVICE — NDL CNTR 40CT FM MAG: Brand: MEDLINE INDUSTRIES, INC.

## (undated) DEVICE — SYRINGE IRRIG 60ML SFT PLIABLE BLB EZ TO GRP 1 HND USE W/

## (undated) DEVICE — GAUZE,SPONGE,4"X4",16PLY,STRL,LF,10/TRAY: Brand: MEDLINE

## (undated) DEVICE — INTENDED FOR TISSUE SEPARATION, AND OTHER PROCEDURES THAT REQUIRE A SHARP SURGICAL BLADE TO PUNCTURE OR CUT.: Brand: BARD-PARKER ® STAINLESS STEEL BLADES

## (undated) DEVICE — GOWN,SIRUS,NONRNF,SETINSLV,XL,20/CS: Brand: MEDLINE